# Patient Record
Sex: FEMALE | Race: WHITE | NOT HISPANIC OR LATINO | Employment: OTHER | URBAN - METROPOLITAN AREA
[De-identification: names, ages, dates, MRNs, and addresses within clinical notes are randomized per-mention and may not be internally consistent; named-entity substitution may affect disease eponyms.]

---

## 2023-04-04 ENCOUNTER — OFFICE VISIT (OUTPATIENT)
Dept: PRIMARY CARE | Facility: CLINIC | Age: 78
End: 2023-04-04
Payer: MEDICARE

## 2023-04-04 VITALS
SYSTOLIC BLOOD PRESSURE: 120 MMHG | BODY MASS INDEX: 22.13 KG/M2 | WEIGHT: 132.8 LBS | HEART RATE: 74 BPM | OXYGEN SATURATION: 97 % | DIASTOLIC BLOOD PRESSURE: 50 MMHG | HEIGHT: 65 IN | TEMPERATURE: 97.6 F

## 2023-04-04 DIAGNOSIS — Z00.00 MEDICARE ANNUAL WELLNESS VISIT, SUBSEQUENT: ICD-10-CM

## 2023-04-04 DIAGNOSIS — E78.2 MIXED HYPERLIPIDEMIA: Primary | ICD-10-CM

## 2023-04-04 DIAGNOSIS — I10 HTN (HYPERTENSION), BENIGN: ICD-10-CM

## 2023-04-04 PROCEDURE — 1036F TOBACCO NON-USER: CPT | Performed by: STUDENT IN AN ORGANIZED HEALTH CARE EDUCATION/TRAINING PROGRAM

## 2023-04-04 PROCEDURE — 1157F ADVNC CARE PLAN IN RCRD: CPT | Performed by: STUDENT IN AN ORGANIZED HEALTH CARE EDUCATION/TRAINING PROGRAM

## 2023-04-04 PROCEDURE — 3074F SYST BP LT 130 MM HG: CPT | Performed by: STUDENT IN AN ORGANIZED HEALTH CARE EDUCATION/TRAINING PROGRAM

## 2023-04-04 PROCEDURE — 1159F MED LIST DOCD IN RCRD: CPT | Performed by: STUDENT IN AN ORGANIZED HEALTH CARE EDUCATION/TRAINING PROGRAM

## 2023-04-04 PROCEDURE — 3078F DIAST BP <80 MM HG: CPT | Performed by: STUDENT IN AN ORGANIZED HEALTH CARE EDUCATION/TRAINING PROGRAM

## 2023-04-04 PROCEDURE — 99214 OFFICE O/P EST MOD 30 MIN: CPT | Performed by: STUDENT IN AN ORGANIZED HEALTH CARE EDUCATION/TRAINING PROGRAM

## 2023-04-04 PROCEDURE — 1160F RVW MEDS BY RX/DR IN RCRD: CPT | Performed by: STUDENT IN AN ORGANIZED HEALTH CARE EDUCATION/TRAINING PROGRAM

## 2023-04-04 RX ORDER — METOPROLOL SUCCINATE 25 MG/1
25 TABLET, EXTENDED RELEASE ORAL DAILY
COMMUNITY
End: 2023-04-18 | Stop reason: SDUPTHER

## 2023-04-04 RX ORDER — ATORVASTATIN CALCIUM 40 MG/1
40 TABLET, FILM COATED ORAL DAILY
COMMUNITY
End: 2023-04-18 | Stop reason: SDUPTHER

## 2023-04-04 ASSESSMENT — ENCOUNTER SYMPTOMS
PALPITATIONS: 0
NAUSEA: 0
FEVER: 0
ABDOMINAL PAIN: 0
COUGH: 0
DIARRHEA: 0
NUMBNESS: 0
CONSTIPATION: 0
FREQUENCY: 0
DIZZINESS: 0
HEADACHES: 0
WHEEZING: 0
FATIGUE: 0
HEMATURIA: 0
SHORTNESS OF BREATH: 0
NERVOUS/ANXIOUS: 0
CHILLS: 0
DYSURIA: 0
DYSPHORIC MOOD: 0

## 2023-04-04 ASSESSMENT — PATIENT HEALTH QUESTIONNAIRE - PHQ9: 1. LITTLE INTEREST OR PLEASURE IN DOING THINGS: NOT AT ALL

## 2023-04-04 NOTE — PROGRESS NOTES
"Subjective   Patient ID: Deena Pop is a 77 y.o. female who presents for new PCP.    HPI   Deena Pop  is a new patient here to get established. Doesn't need refills today, will let me know    Previous PCP: Dr Amin  Past medical history: HTN, HLD, Osteoporosis, varicose vv, CVA cerebellar  Family Hx:   Family History   Problem Relation Name Age of Onset   • Hypertension Mother     • Hypertension Father     • Colon cancer Mother's Brother     • Stroke Maternal Grandmother        Specialists include: Vascular surgery: Varicose VV, Dr Myers, injections  -Podiatry: Dr Xaio, toenails  -eye doctor  -dentist  -dermatology    Immunizations: Hasn't had shingrix, declines tdap b/c of SE of pain  Colonoscopy: n/a  Mammogram: n/a  Tobacco: n/a  Alcohol: very occasional  Illicit drugs: no  Caffeine: no  Review of Systems   Constitutional:  Negative for chills, fatigue and fever.   Respiratory:  Negative for cough, shortness of breath and wheezing.    Cardiovascular:  Negative for chest pain, palpitations and leg swelling.   Gastrointestinal:  Negative for abdominal pain, constipation, diarrhea and nausea.   Genitourinary:  Negative for dysuria, frequency, hematuria and urgency.   Neurological:  Negative for dizziness, numbness and headaches.   Psychiatric/Behavioral:  Negative for dysphoric mood. The patient is not nervous/anxious.        Objective   /78 (BP Location: Left arm, Patient Position: Sitting, BP Cuff Size: Adult)   Pulse 74   Temp 36.4 °C (97.6 °F) (Temporal)   Ht 1.651 m (5' 5\")   Wt 60.2 kg (132 lb 12.8 oz)   SpO2 97%   BMI 22.10 kg/m²     Physical Exam  Constitutional:       Appearance: Normal appearance.   HENT:      Head: Normocephalic and atraumatic.   Eyes:      Extraocular Movements: Extraocular movements intact.      Pupils: Pupils are equal, round, and reactive to light.   Cardiovascular:      Rate and Rhythm: Normal rate and regular rhythm.      Heart sounds: Normal heart " sounds. No murmur heard.  Pulmonary:      Effort: Pulmonary effort is normal.      Breath sounds: Normal breath sounds. No wheezing.   Abdominal:      General: Bowel sounds are normal.      Palpations: Abdomen is soft.      Tenderness: There is no abdominal tenderness. There is no guarding.   Musculoskeletal:         General: Normal range of motion.   Skin:     General: Skin is warm and dry.   Neurological:      General: No focal deficit present.      Mental Status: She is alert and oriented to person, place, and time.   Psychiatric:         Mood and Affect: Mood normal.         Behavior: Behavior normal.       Assessment/Plan   Problem List Items Addressed This Visit          Circulatory    HTN (hypertension), benign     stable            Other    Hyperlipidemia - Primary     Stable, will recheck with physical later this year          Other Visit Diagnoses       Medicare annual wellness visit, subsequent        Relevant Orders    Follow Up In Primary Care                 Patient understands and agrees with treatment plan    Radha Armstrong DO   04/04/23

## 2023-04-07 ENCOUNTER — TELEPHONE (OUTPATIENT)
Dept: PRIMARY CARE | Facility: CLINIC | Age: 78
End: 2023-04-07
Payer: MEDICARE

## 2023-04-18 DIAGNOSIS — E78.5 HYPERLIPIDEMIA, UNSPECIFIED HYPERLIPIDEMIA TYPE: ICD-10-CM

## 2023-04-18 DIAGNOSIS — I10 HTN (HYPERTENSION), BENIGN: ICD-10-CM

## 2023-04-20 RX ORDER — METOPROLOL SUCCINATE 25 MG/1
25 TABLET, EXTENDED RELEASE ORAL DAILY
Qty: 90 TABLET | Refills: 1 | Status: SHIPPED | OUTPATIENT
Start: 2023-04-20 | End: 2023-10-04

## 2023-04-20 RX ORDER — ATORVASTATIN CALCIUM 40 MG/1
40 TABLET, FILM COATED ORAL DAILY
Qty: 90 TABLET | Refills: 1 | Status: SHIPPED | OUTPATIENT
Start: 2023-04-20 | End: 2023-10-19 | Stop reason: SDUPTHER

## 2023-06-12 PROBLEM — B02.9 SHINGLES: Status: ACTIVE | Noted: 2023-06-12

## 2023-06-12 RX ORDER — VALACYCLOVIR HYDROCHLORIDE 1 G/1
1 TABLET, FILM COATED ORAL 3 TIMES DAILY
COMMUNITY
Start: 2020-11-21 | End: 2023-11-09 | Stop reason: ALTCHOICE

## 2023-06-13 ENCOUNTER — OFFICE VISIT (OUTPATIENT)
Dept: PRIMARY CARE | Facility: CLINIC | Age: 78
End: 2023-06-13
Payer: MEDICARE

## 2023-06-13 VITALS
BODY MASS INDEX: 22.3 KG/M2 | SYSTOLIC BLOOD PRESSURE: 134 MMHG | TEMPERATURE: 97.6 F | WEIGHT: 134 LBS | DIASTOLIC BLOOD PRESSURE: 82 MMHG

## 2023-06-13 DIAGNOSIS — L73.9 FOLLICULITIS: Primary | ICD-10-CM

## 2023-06-13 PROCEDURE — 1160F RVW MEDS BY RX/DR IN RCRD: CPT | Performed by: NURSE PRACTITIONER

## 2023-06-13 PROCEDURE — 1036F TOBACCO NON-USER: CPT | Performed by: NURSE PRACTITIONER

## 2023-06-13 PROCEDURE — 3075F SYST BP GE 130 - 139MM HG: CPT | Performed by: NURSE PRACTITIONER

## 2023-06-13 PROCEDURE — 1159F MED LIST DOCD IN RCRD: CPT | Performed by: NURSE PRACTITIONER

## 2023-06-13 PROCEDURE — 3079F DIAST BP 80-89 MM HG: CPT | Performed by: NURSE PRACTITIONER

## 2023-06-13 PROCEDURE — 99213 OFFICE O/P EST LOW 20 MIN: CPT | Performed by: NURSE PRACTITIONER

## 2023-06-13 PROCEDURE — 1157F ADVNC CARE PLAN IN RCRD: CPT | Performed by: NURSE PRACTITIONER

## 2023-06-13 RX ORDER — VITAMIN B COMPLEX
1 CAPSULE ORAL DAILY
COMMUNITY

## 2023-06-13 RX ORDER — FAMOTIDINE 20 MG/1
20 TABLET, FILM COATED ORAL NIGHTLY PRN
COMMUNITY

## 2023-06-13 RX ORDER — ASPIRIN 81 MG/1
81 TABLET ORAL DAILY
COMMUNITY

## 2023-06-13 RX ORDER — CHOLECALCIFEROL (VITAMIN D3) 125 MCG
5000 CAPSULE ORAL DAILY
COMMUNITY

## 2023-06-13 RX ORDER — CALCIUM CARBONATE 600 MG
1 TABLET ORAL DAILY
COMMUNITY

## 2023-06-13 ASSESSMENT — ENCOUNTER SYMPTOMS
CARDIOVASCULAR NEGATIVE: 1
RESPIRATORY NEGATIVE: 1
CONSTITUTIONAL NEGATIVE: 1
CONSTIPATION: 1

## 2023-06-13 NOTE — PROGRESS NOTES
Subjective   Patient ID: Deena Pop is a 77 y.o. female who presents for skin complaint (Lump inside right side of vagina x 4 days).    HPI Presents today with 4 days history of lump on the right lower labial fold  She has been staining to have BM because of constipation.  Is not sure if that had anything to do with why this happened.   Noticed the 4 days ago was wiping and noticed a sore spot  Yesterday was bleeding  Not quite as sore today. Today still  has   She does wear urine pad for urinary incontinence  Review of Systems   Constitutional: Negative.    Respiratory: Negative.     Cardiovascular: Negative.    Gastrointestinal:  Positive for constipation.   Genitourinary:         As noted in HPI         Objective   /82 (BP Location: Right arm, Patient Position: Sitting)   Temp 36.4 °C (97.6 °F) (Temporal)   Wt 60.8 kg (134 lb)   BMI 22.30 kg/m²     Physical Exam  Constitutional:       Appearance: Normal appearance.   Cardiovascular:      Rate and Rhythm: Normal rate and regular rhythm.   Pulmonary:      Effort: Pulmonary effort is normal.      Breath sounds: Normal breath sounds.   Genitourinary:     Pubic Area: No rash.       Labia:         Right: Lesion present.         Left: No rash, tenderness, lesion or injury.       Comments: Scabbed tender to touch pustule tight lower labia  Musculoskeletal:         General: Normal range of motion.   Neurological:      General: No focal deficit present.      Mental Status: She is alert.   Psychiatric:         Mood and Affect: Mood normal.         Behavior: Behavior normal.         Assessment/Plan   Problem List Items Addressed This Visit    None  Visit Diagnoses       Folliculitis    -  Primary

## 2023-09-29 ENCOUNTER — TELEPHONE (OUTPATIENT)
Dept: PHARMACY | Facility: HOSPITAL | Age: 78
End: 2023-09-29
Payer: MEDICARE

## 2023-09-29 NOTE — TELEPHONE ENCOUNTER
Population Health: Outreach by Ambulatory Pharmacy Team    Payor: DEVAUGHN BAKER  Reason: Adherence  Medication: Atorvastatin  Outcome: Patient Reached: Will Refill, [facility] is sending her a refill in the mail!     Taylor Singh   PharmD Candidate 2024

## 2023-10-02 DIAGNOSIS — I10 HTN (HYPERTENSION), BENIGN: ICD-10-CM

## 2023-10-04 RX ORDER — METOPROLOL SUCCINATE 25 MG/1
TABLET, EXTENDED RELEASE ORAL
Qty: 90 TABLET | Refills: 0 | Status: SHIPPED | OUTPATIENT
Start: 2023-10-04 | End: 2024-01-02

## 2023-10-13 ENCOUNTER — APPOINTMENT (OUTPATIENT)
Dept: RADIOLOGY | Facility: HOSPITAL | Age: 78
End: 2023-10-13
Payer: MEDICARE

## 2023-10-13 ENCOUNTER — TELEPHONE (OUTPATIENT)
Dept: PRIMARY CARE | Facility: CLINIC | Age: 78
End: 2023-10-13
Payer: MEDICARE

## 2023-10-13 ENCOUNTER — HOSPITAL ENCOUNTER (EMERGENCY)
Facility: HOSPITAL | Age: 78
Discharge: HOME | End: 2023-10-13
Attending: EMERGENCY MEDICINE
Payer: MEDICARE

## 2023-10-13 VITALS
TEMPERATURE: 98.6 F | HEIGHT: 64 IN | BODY MASS INDEX: 22.21 KG/M2 | HEART RATE: 72 BPM | WEIGHT: 130.07 LBS | OXYGEN SATURATION: 98 % | RESPIRATION RATE: 20 BRPM | SYSTOLIC BLOOD PRESSURE: 120 MMHG | DIASTOLIC BLOOD PRESSURE: 87 MMHG

## 2023-10-13 DIAGNOSIS — R42 DIZZINESS: Primary | ICD-10-CM

## 2023-10-13 LAB
ANION GAP BLDV CALCULATED.4IONS-SCNC: 9 MMOL/L (ref 10–25)
ANION GAP SERPL CALC-SCNC: 14 MMOL/L (ref 10–20)
BASE EXCESS BLDV CALC-SCNC: 3.9 MMOL/L (ref -2–3)
BASOPHILS # BLD AUTO: 0.03 X10*3/UL (ref 0–0.1)
BASOPHILS NFR BLD AUTO: 0.5 %
BNP SERPL-MCNC: 224 PG/ML (ref 0–99)
BODY TEMPERATURE: 37 DEGREES CELSIUS
BUN SERPL-MCNC: 19 MG/DL (ref 6–23)
CA-I BLDV-SCNC: 1.22 MMOL/L (ref 1.1–1.33)
CALCIUM SERPL-MCNC: 10.3 MG/DL (ref 8.6–10.3)
CARDIAC TROPONIN I PNL SERPL HS: 10 NG/L (ref 0–13)
CHLORIDE BLDV-SCNC: 101 MMOL/L (ref 98–107)
CHLORIDE SERPL-SCNC: 104 MMOL/L (ref 98–107)
CO2 SERPL-SCNC: 28 MMOL/L (ref 21–32)
CREAT SERPL-MCNC: 0.87 MG/DL (ref 0.5–1.05)
EOSINOPHIL # BLD AUTO: 0.04 X10*3/UL (ref 0–0.4)
EOSINOPHIL NFR BLD AUTO: 0.7 %
ERYTHROCYTE [DISTWIDTH] IN BLOOD BY AUTOMATED COUNT: 12.2 % (ref 11.5–14.5)
GFR SERPL CREATININE-BSD FRML MDRD: 68 ML/MIN/1.73M*2
GLUCOSE BLDV-MCNC: 107 MG/DL (ref 74–99)
GLUCOSE SERPL-MCNC: 97 MG/DL (ref 74–99)
HCO3 BLDV-SCNC: 30.6 MMOL/L (ref 22–26)
HCT VFR BLD AUTO: 47.2 % (ref 36–46)
HCT VFR BLD EST: 46 % (ref 36–46)
HGB BLD-MCNC: 15.9 G/DL (ref 12–16)
HGB BLDV-MCNC: 15.3 G/DL (ref 12–16)
IMM GRANULOCYTES # BLD AUTO: 0.01 X10*3/UL (ref 0–0.5)
IMM GRANULOCYTES NFR BLD AUTO: 0.2 % (ref 0–0.9)
INHALED O2 CONCENTRATION: 100 %
LACTATE BLDV-SCNC: 0.9 MMOL/L (ref 0.4–2)
LYMPHOCYTES # BLD AUTO: 1.83 X10*3/UL (ref 0.8–3)
LYMPHOCYTES NFR BLD AUTO: 31.5 %
MAGNESIUM SERPL-MCNC: 2.14 MG/DL (ref 1.6–2.4)
MCH RBC QN AUTO: 32.7 PG (ref 26–34)
MCHC RBC AUTO-ENTMCNC: 33.7 G/DL (ref 32–36)
MCV RBC AUTO: 97 FL (ref 80–100)
MONOCYTES # BLD AUTO: 0.45 X10*3/UL (ref 0.05–0.8)
MONOCYTES NFR BLD AUTO: 7.7 %
NEUTROPHILS # BLD AUTO: 3.45 X10*3/UL (ref 1.6–5.5)
NEUTROPHILS NFR BLD AUTO: 59.4 %
NRBC BLD-RTO: 0 /100 WBCS (ref 0–0)
OXYHGB MFR BLDV: 22.4 % (ref 45–75)
PCO2 BLDV: 53 MM HG (ref 41–51)
PH BLDV: 7.37 PH (ref 7.33–7.43)
PLATELET # BLD AUTO: 248 X10*3/UL (ref 150–450)
PMV BLD AUTO: 11 FL (ref 7.5–11.5)
PO2 BLDV: 20 MM HG (ref 35–45)
POTASSIUM BLDV-SCNC: 4.1 MMOL/L (ref 3.5–5.3)
POTASSIUM SERPL-SCNC: 4.2 MMOL/L (ref 3.5–5.3)
RBC # BLD AUTO: 4.86 X10*6/UL (ref 4–5.2)
SAO2 % BLDV: 23 % (ref 45–75)
SODIUM BLDV-SCNC: 136 MMOL/L (ref 136–145)
SODIUM SERPL-SCNC: 142 MMOL/L (ref 136–145)
WBC # BLD AUTO: 5.8 X10*3/UL (ref 4.4–11.3)

## 2023-10-13 PROCEDURE — 82330 ASSAY OF CALCIUM: CPT | Performed by: EMERGENCY MEDICINE

## 2023-10-13 PROCEDURE — 84484 ASSAY OF TROPONIN QUANT: CPT | Performed by: EMERGENCY MEDICINE

## 2023-10-13 PROCEDURE — 70450 CT HEAD/BRAIN W/O DYE: CPT | Performed by: RADIOLOGY

## 2023-10-13 PROCEDURE — 36415 COLL VENOUS BLD VENIPUNCTURE: CPT | Performed by: EMERGENCY MEDICINE

## 2023-10-13 PROCEDURE — 83880 ASSAY OF NATRIURETIC PEPTIDE: CPT | Performed by: EMERGENCY MEDICINE

## 2023-10-13 PROCEDURE — 71045 X-RAY EXAM CHEST 1 VIEW: CPT | Mod: FY

## 2023-10-13 PROCEDURE — 70450 CT HEAD/BRAIN W/O DYE: CPT | Mod: MG

## 2023-10-13 PROCEDURE — 99284 EMERGENCY DEPT VISIT MOD MDM: CPT | Mod: 25

## 2023-10-13 PROCEDURE — 85018 HEMOGLOBIN: CPT | Mod: 59 | Performed by: EMERGENCY MEDICINE

## 2023-10-13 PROCEDURE — 71045 X-RAY EXAM CHEST 1 VIEW: CPT | Performed by: RADIOLOGY

## 2023-10-13 PROCEDURE — 84132 ASSAY OF SERUM POTASSIUM: CPT | Mod: 59 | Performed by: EMERGENCY MEDICINE

## 2023-10-13 PROCEDURE — 83735 ASSAY OF MAGNESIUM: CPT | Performed by: EMERGENCY MEDICINE

## 2023-10-13 PROCEDURE — 85025 COMPLETE CBC W/AUTO DIFF WBC: CPT | Performed by: EMERGENCY MEDICINE

## 2023-10-13 ASSESSMENT — COLUMBIA-SUICIDE SEVERITY RATING SCALE - C-SSRS
1. IN THE PAST MONTH, HAVE YOU WISHED YOU WERE DEAD OR WISHED YOU COULD GO TO SLEEP AND NOT WAKE UP?: NO
6. HAVE YOU EVER DONE ANYTHING, STARTED TO DO ANYTHING, OR PREPARED TO DO ANYTHING TO END YOUR LIFE?: NO
2. HAVE YOU ACTUALLY HAD ANY THOUGHTS OF KILLING YOURSELF?: NO

## 2023-10-13 ASSESSMENT — LIFESTYLE VARIABLES
HAVE PEOPLE ANNOYED YOU BY CRITICIZING YOUR DRINKING: NO
EVER HAD A DRINK FIRST THING IN THE MORNING TO STEADY YOUR NERVES TO GET RID OF A HANGOVER: NO
REASON UNABLE TO ASSESS: NO
HAVE YOU EVER FELT YOU SHOULD CUT DOWN ON YOUR DRINKING: NO
EVER FELT BAD OR GUILTY ABOUT YOUR DRINKING: NO

## 2023-10-13 ASSESSMENT — PAIN SCALES - GENERAL
PAINLEVEL_OUTOF10: 3
PAINLEVEL_OUTOF10: 0 - NO PAIN

## 2023-10-13 ASSESSMENT — PAIN - FUNCTIONAL ASSESSMENT: PAIN_FUNCTIONAL_ASSESSMENT: 0-10

## 2023-10-13 NOTE — ED PROVIDER NOTES
HPI   Chief Complaint   Patient presents with    Dizziness     Onset midnight last night.  States had balance problems shortly.  No focal weakness.  Symptoms resolved completely now.         HPI:  Patient is a 77-year-old female with history of SVT and remote stroke who presents emergency department after having dizziness with symptoms that feel similar to her old stroke that was 6 years ago.  Patient reports she was in her usual state of health until around midnight when she got very dizzy she was unsteady and had to hold onto things to move around in her house she went to sleep thinking she would be better the next day she says at 7 AM when she awoke she still had some very mild dizziness but it was much improved from when it was at midnight and then about 8:00 her symptoms seem to have completely resolved she states that she now feels completely back to her normal self and the only reason why she came into the ED was because people kept telling her that she should get checked out and she called her primary care provider who all and will also advised her to go to the emergency department.  Currently she is asymptomatic denies any headache no dizziness no lightheadedness no chest discomfort no shortness of breath no abdominal complaints    Limitations to history: None  Independent Historians: Family at bedside  External Records Reviewed: EMR records and paper medication list  ------------------------------------------------------------------------------------------------------------------------------------------  ROS: a ten point review of systems was performed and negative except as per HPI.  ------------------------------------------------------------------------------------------------------------------------------------------  PMH / PSH: as per HPI, reviewed in EMR and discussed with the patient []  MEDS:  reviewed in EMR and discussed with the patient []  ALLERGIES: reviewed in EMR[]  SocH:  as per HPI,  otherwise reviewed in EMR []  FH:  as per HPI, otherwise reviewed in EMR []  ------------------------------------------------------------------------------------------------------------------------------------------  Physical Exam:  VS: As documented in the triage note and EMR flowsheet from this visit was reviewed  General: Well appearing. No acute distress.   Eyes:  Extraocular movements grossly intact. No scleral icterus.   HEENT: Atraumatic. Normocephalic.    Neck: Supple. No gross masses  CV: RRR, audible S1/S2, 2+ symmetric peripheral pulses  Resp: Clear to auscultation bilaterally. No respiratory distress.  Non-labored respirations  GI: Soft, non-tender, non-distended, no rebound or gaurding  MSK: Symmetric muscle bulk. No gross step offs or deformities.  Skin: Warm, dry, no obvious rash.  Neuro: Speech fluent. Awake. Alert. Appropriate conversation.  Psych: Appropriate mood and affect for situation      ------------------------------------------------------------------------------------------------------------------------------------------  Hospital Course / Medical Decision Making:  Independent Interpretations:  EKG -   Twelve-lead EKG performed and independently interpreted by me as showing normal sinus rhythm at a ventricular rate of 66 with signs of LVH QRS interval of 134 QTc of 464 with T wave depression in the lateral leads no ST segment elevation    77-year-old female presenting with chief complaint of dizziness she is currently asymptomatic and has been feeling back at her normal baseline self since about 8:00 this morning but came into the ED at the encouragement of her primary physician who recommended she get evaluated based on her history of stroke as well as the symptoms that she experienced last night around midnight.  Her physical exam is reassuring her NIH stroke scale is 0 she has no focal neurologic deficits her gait is steady and normal and no signs of ataxia.    The cardiac monitor  peripheral IV access was obtained labs are drawn.  Blood work was fairly unremarkable electrolytes were all within normal range Noncon CT of the head was negative for any acute findings.  She remained asymptomatic while in the emergency department she was requesting discharge since she was feeling well I did  her regarding her concerning symptoms and discussed her case with the neurologist on-call who agreed that she could have close follow-up in the outpatient setting.  Patient was counseled regarding her diagnosis of dizziness and possible TIA and was encouraged to return immediately to the emergency department or call 911 for any recurrence of significant symptoms.  She was discharged home the care of her family in stable condition and will follow-up with her primary care physician as well as neurology    Patient care discussed with: Neurology consultant  Social Determinants affecting care:     Final diagnosis and disposition: Dizziness            Stephany Suarez MD                                      No data recorded                Patient History   No past medical history on file.  No past surgical history on file.  Family History   Problem Relation Name Age of Onset    Hypertension Mother      Hypertension Father      Colon cancer Mother's Brother      Stroke Maternal Grandmother       Social History     Tobacco Use    Smoking status: Never     Passive exposure: Past    Smokeless tobacco: Never   Substance Use Topics    Alcohol use: Yes     Alcohol/week: 2.0 standard drinks of alcohol     Types: 1 Cans of beer, 1 Standard drinks or equivalent per week    Drug use: Never       Physical Exam   ED Triage Vitals   Temp Heart Rate Resp BP   10/13/23 1624 10/13/23 1624 10/13/23 1624 10/13/23 1624   36.7 °C (98.1 °F) 71 16 147/55      SpO2 Temp Source Heart Rate Source Patient Position   10/13/23 1624 10/13/23 1624 10/13/23 1758 10/13/23 1755   97 % Temporal Monitor Lying      BP Location FiO2 (%)      10/13/23 1759 --     Left arm        Physical Exam  Neurological:      Mental Status: She is alert and oriented to person, place, and time.      GCS: GCS eye subscore is 4. GCS verbal subscore is 5. GCS motor subscore is 6.      Cranial Nerves: Cranial nerves 2-12 are intact.      Sensory: Sensation is intact.      Motor: Motor function is intact.      Coordination: Coordination is intact.      Gait: Gait is intact.         ED Course & MDM        Medical Decision Making      Procedure  Procedures     Stephany Suarez MD  10/13/23 [id number]

## 2023-10-13 NOTE — TELEPHONE ENCOUNTER
PT called and said she thinks she may have had a small stroke last evening, wanted to know what she should do?  I spoke with Dr. Armstrong and she said patient needed to go to the ER.  This was relayed to the patient who expressed understanding.

## 2023-10-18 ENCOUNTER — APPOINTMENT (OUTPATIENT)
Dept: NEUROLOGY | Facility: CLINIC | Age: 78
End: 2023-10-18
Payer: MEDICARE

## 2023-10-19 ENCOUNTER — OFFICE VISIT (OUTPATIENT)
Dept: PRIMARY CARE | Facility: CLINIC | Age: 78
End: 2023-10-19
Payer: MEDICARE

## 2023-10-19 VITALS
BODY MASS INDEX: 22.74 KG/M2 | HEART RATE: 73 BPM | TEMPERATURE: 97.4 F | SYSTOLIC BLOOD PRESSURE: 158 MMHG | DIASTOLIC BLOOD PRESSURE: 88 MMHG | WEIGHT: 132.4 LBS | OXYGEN SATURATION: 97 %

## 2023-10-19 DIAGNOSIS — E78.5 HYPERLIPIDEMIA, UNSPECIFIED HYPERLIPIDEMIA TYPE: ICD-10-CM

## 2023-10-19 DIAGNOSIS — Z86.73 PERSONAL HISTORY OF TRANSIENT ISCHEMIC ATTACK: ICD-10-CM

## 2023-10-19 DIAGNOSIS — G45.9 TIA (TRANSIENT ISCHEMIC ATTACK): Primary | ICD-10-CM

## 2023-10-19 PROCEDURE — 99214 OFFICE O/P EST MOD 30 MIN: CPT | Performed by: STUDENT IN AN ORGANIZED HEALTH CARE EDUCATION/TRAINING PROGRAM

## 2023-10-19 PROCEDURE — 1159F MED LIST DOCD IN RCRD: CPT | Performed by: STUDENT IN AN ORGANIZED HEALTH CARE EDUCATION/TRAINING PROGRAM

## 2023-10-19 PROCEDURE — 1126F AMNT PAIN NOTED NONE PRSNT: CPT | Performed by: STUDENT IN AN ORGANIZED HEALTH CARE EDUCATION/TRAINING PROGRAM

## 2023-10-19 PROCEDURE — 3079F DIAST BP 80-89 MM HG: CPT | Performed by: STUDENT IN AN ORGANIZED HEALTH CARE EDUCATION/TRAINING PROGRAM

## 2023-10-19 PROCEDURE — 3077F SYST BP >= 140 MM HG: CPT | Performed by: STUDENT IN AN ORGANIZED HEALTH CARE EDUCATION/TRAINING PROGRAM

## 2023-10-19 PROCEDURE — 1160F RVW MEDS BY RX/DR IN RCRD: CPT | Performed by: STUDENT IN AN ORGANIZED HEALTH CARE EDUCATION/TRAINING PROGRAM

## 2023-10-19 PROCEDURE — 1036F TOBACCO NON-USER: CPT | Performed by: STUDENT IN AN ORGANIZED HEALTH CARE EDUCATION/TRAINING PROGRAM

## 2023-10-19 RX ORDER — ATORVASTATIN CALCIUM 40 MG/1
40 TABLET, FILM COATED ORAL DAILY
Qty: 90 TABLET | Refills: 1 | Status: SHIPPED | OUTPATIENT
Start: 2023-10-19 | End: 2024-05-14 | Stop reason: SDUPTHER

## 2023-10-19 ASSESSMENT — PATIENT HEALTH QUESTIONNAIRE - PHQ9
SUM OF ALL RESPONSES TO PHQ9 QUESTIONS 1 AND 2: 0
1. LITTLE INTEREST OR PLEASURE IN DOING THINGS: NOT AT ALL
2. FEELING DOWN, DEPRESSED OR HOPELESS: NOT AT ALL

## 2023-10-19 NOTE — PATIENT INSTRUCTIONS
We will check a Holter monitor, echocardiogram, and carotid artery ultrasound  Follow-up with the neurologist as scheduled  If you develop any of the symptoms again please call [phone]

## 2023-10-19 NOTE — PROGRESS NOTES
Subjective   Patient ID: Deena Pop is a 77 y.o. female who presents for Vertigo ( [place] follow up 10/13/23).    HPI   The patient is here for a hospital follow up.  Hospital records were reviewed prior to visit, including relevant labs, imaging findings, consultant notes, and discharge summary.  Medications were reconciled and are current, reviewed today.     Patient here for hospital follow-up.  She called our office and stated that she became dizzy and was having symptoms that felt like her previous stroke.  She was feeling fine up until midnight and went to sleep thinking it would be better by the time she woke up.  At 7 AM she still had some very mild dizziness but it was improved from the prior symptoms.  At around 8 AM symptoms completely resolved.  She felt back to normal.  We advised her to go to the emergency department as did others around her.    Blood work was relatively normal.  Noncontrast head CT was negative for any acute findings.  She was asymptomatic her entire time in the emergency department and was requesting to discharge since she felt well.  She was counseled regarding her symptoms and the emergency room doctor discussed the case with the on-call neurologist. He said that she could have close follow-up in the outpatient setting and could be discharged with return precautions.    Isn't seeing neuro until December.    Review of Systems   Constitutional:  Negative for chills, fatigue and fever.   Respiratory:  Negative for cough, shortness of breath and wheezing.    Cardiovascular:  Negative for chest pain, palpitations and leg swelling.   Gastrointestinal:  Negative for abdominal pain, constipation, diarrhea and nausea.   Genitourinary:  Negative for dysuria, frequency, hematuria and urgency.   Neurological:  Negative for dizziness, numbness and headaches.   Psychiatric/Behavioral:  Negative for dysphoric mood. The patient is not nervous/anxious.        Objective   /88 (BP  Location: Left arm, Patient Position: Sitting, BP Cuff Size: Adult)   Pulse 73   Temp 36.3 °C (97.4 °F) (Temporal)   Wt 60.1 kg (132 lb 6.4 oz)   SpO2 97%   BMI 22.74 kg/m²     Physical Exam  Constitutional:       Appearance: Normal appearance.   HENT:      Head: Normocephalic and atraumatic.   Eyes:      Extraocular Movements: Extraocular movements intact.      Pupils: Pupils are equal, round, and reactive to light.   Cardiovascular:      Rate and Rhythm: Normal rate and regular rhythm.      Heart sounds: Normal heart sounds. No murmur heard.  Pulmonary:      Effort: Pulmonary effort is normal.      Breath sounds: Normal breath sounds. No wheezing.   Abdominal:      General: Bowel sounds are normal.      Palpations: Abdomen is soft.      Tenderness: There is no abdominal tenderness. There is no guarding.   Musculoskeletal:         General: Normal range of motion.   Skin:     General: Skin is warm and dry.   Neurological:      General: No focal deficit present.      Mental Status: She is alert and oriented to person, place, and time.      Sensory: Sensation is intact.      Motor: No weakness, tremor, abnormal muscle tone or pronator drift.      Coordination: Romberg sign negative. Coordination normal. Finger-Nose-Finger Test and Heel to Shin Test normal. Rapid alternating movements normal.      Gait: Gait normal.   Psychiatric:         Mood and Affect: Mood normal.         Behavior: Behavior normal.         Assessment/Plan   Problem List Items Addressed This Visit       Hyperlipidemia    Relevant Medications    atorvastatin (Lipitor) 40 mg tablet     Other Visit Diagnoses       TIA (transient ischemic attack)    -  Primary    Relevant Orders    Transthoracic Echo (TTE) Complete    Vascular US carotid artery duplex bilateral    Holter or Event Cardiac Monitor    Personal history of transient ischemic attack        Relevant Orders    Vascular US carotid artery duplex bilateral          Patient has what sounds  like a TIA.  She does have a history of stroke.  She was not admitted and therefore did not have a stroke work-up.  Therefore, I am checking a TTE, carotid ultrasound, and a Holter monitor as well.  She is getting in with neurology and I recommended keeping that appointment but we will do this work-up in the meantime.  We will touch base following results       Patient understands and agrees with treatment plan    Radha Armstrong, DO   10/24/23

## 2023-10-24 ASSESSMENT — ENCOUNTER SYMPTOMS
NERVOUS/ANXIOUS: 0
CONSTIPATION: 0
DYSURIA: 0
FREQUENCY: 0
DIARRHEA: 0
PALPITATIONS: 0
FEVER: 0
HEMATURIA: 0
COUGH: 0
DIZZINESS: 0
CHILLS: 0
NAUSEA: 0
NUMBNESS: 0
ABDOMINAL PAIN: 0
WHEEZING: 0
FATIGUE: 0
SHORTNESS OF BREATH: 0
HEADACHES: 0
DYSPHORIC MOOD: 0

## 2023-10-26 ENCOUNTER — HOSPITAL ENCOUNTER (OUTPATIENT)
Dept: CARDIOLOGY | Facility: HOSPITAL | Age: 78
Discharge: HOME | End: 2023-10-26
Payer: MEDICARE

## 2023-10-26 DIAGNOSIS — G45.9 TIA (TRANSIENT ISCHEMIC ATTACK): ICD-10-CM

## 2023-10-26 LAB — EJECTION FRACTION APICAL 4 CHAMBER: 68.9

## 2023-10-26 PROCEDURE — 93306 TTE W/DOPPLER COMPLETE: CPT

## 2023-10-26 PROCEDURE — 2500000004 HC RX 250 GENERAL PHARMACY W/ HCPCS (ALT 636 FOR OP/ED): Performed by: INTERNAL MEDICINE

## 2023-10-26 PROCEDURE — 93306 TTE W/DOPPLER COMPLETE: CPT | Performed by: INTERNAL MEDICINE

## 2023-10-26 RX ADMIN — HUMAN ALBUMIN MICROSPHERES AND PERFLUTREN 0.5 ML: 10; .22 INJECTION, SOLUTION INTRAVENOUS at 09:09

## 2023-11-03 ENCOUNTER — OFFICE VISIT (OUTPATIENT)
Dept: PODIATRY | Facility: CLINIC | Age: 78
End: 2023-11-03
Payer: MEDICARE

## 2023-11-03 DIAGNOSIS — B35.1 TINEA UNGUIUM: Primary | ICD-10-CM

## 2023-11-03 DIAGNOSIS — M79.675 TOE PAIN, LEFT: ICD-10-CM

## 2023-11-03 DIAGNOSIS — M79.674 TOE PAIN, RIGHT: ICD-10-CM

## 2023-11-03 PROCEDURE — 3079F DIAST BP 80-89 MM HG: CPT | Performed by: PODIATRIST

## 2023-11-03 PROCEDURE — 3077F SYST BP >= 140 MM HG: CPT | Performed by: PODIATRIST

## 2023-11-03 PROCEDURE — 1159F MED LIST DOCD IN RCRD: CPT | Performed by: PODIATRIST

## 2023-11-03 PROCEDURE — 1160F RVW MEDS BY RX/DR IN RCRD: CPT | Performed by: PODIATRIST

## 2023-11-03 PROCEDURE — 1126F AMNT PAIN NOTED NONE PRSNT: CPT | Performed by: PODIATRIST

## 2023-11-03 PROCEDURE — 11721 DEBRIDE NAIL 6 OR MORE: CPT | Performed by: PODIATRIST

## 2023-11-03 PROCEDURE — 1036F TOBACCO NON-USER: CPT | Performed by: PODIATRIST

## 2023-11-05 NOTE — PROGRESS NOTES
CC:  painful thickened and elongated toenails    HPI:  Pt presents complaining painful thickened and elongated toenails that are difficult to manage.  Onset was gradual with worsening course until recently.  Aggravated by shoe gear and ambulation.       PCP: Dr. Armstrong  Last visit: 10-19-23     PMH  No past medical history on file.  MEDS    Current Outpatient Medications:     aspirin 81 mg EC tablet, Take 1 tablet (81 mg) by mouth once daily., Disp: , Rfl:     atorvastatin (Lipitor) 40 mg tablet, Take 1 tablet (40 mg) by mouth once daily. At bedtime, Disp: 90 tablet, Rfl: 1    b complex vitamins (Vitamins B Complex) capsule, Take 1 capsule by mouth once daily., Disp: , Rfl:     calcium carbonate 600 mg calcium (1,500 mg) tablet, Take 1 tablet (600 mg) by mouth once daily., Disp: , Rfl:     cholecalciferol (Vitamin D-3) 125 MCG (5000 UT) capsule, Take 1 capsule (5,000 Units) by mouth once daily., Disp: , Rfl:     famotidine (Pepcid) 20 mg tablet, Take 1 tablet (20 mg) by mouth as needed at bedtime for indigestion., Disp: , Rfl:     fluticasone (Veramyst) 27.5 mcg/actuation nasal spray, Administer 2 sprays into affected nostril(s) once daily., Disp: , Rfl:     metoprolol succinate XL (Toprol-XL) 25 mg 24 hr tablet, TAKE 1 TABLET DAILY (DO NOT CRUSH OR CHEW), Disp: 90 tablet, Rfl: 0    valACYclovir (Valtrex) 1 gram tablet, Take 1 tablet (1,000 mg) by mouth 3 times a day., Disp: , Rfl:   Allergies  Allergies   Allergen Reactions    Sulfa (Sulfonamide Antibiotics) Unknown     Social History     Socioeconomic History    Marital status:      Spouse name: Not on file    Number of children: Not on file    Years of education: Not on file    Highest education level: Not on file   Occupational History    Not on file   Tobacco Use    Smoking status: Never     Passive exposure: Past    Smokeless tobacco: Never   Substance and Sexual Activity    Alcohol use: Yes     Alcohol/week: 2.0 standard drinks of alcohol     Types:  1 Cans of beer, 1 Standard drinks or equivalent per week    Drug use: Never    Sexual activity: Not on file   Other Topics Concern    Not on file   Social History Narrative    Not on file     Social Determinants of Health     Financial Resource Strain: Not on file   Food Insecurity: Not on file   Transportation Needs: Not on file   Physical Activity: Not on file   Stress: Not on file   Social Connections: Not on file   Intimate Partner Violence: Not on file   Housing Stability: Not on file     Family History   Problem Relation Name Age of Onset    Hypertension Mother      Hypertension Father      Colon cancer Mother's Brother      Stroke Maternal Grandmother       No past surgical history on file.    REVIEW OF SYSTEMS  2/4 b/l.  mild edema noted. mild varicosities b/l.  CFT  5 seconds to all digits bilateral.  Skin temperature is warm to warm from proximal to distal bilateral.      Muscular: Strength is 5/5 for all instrinsic and extrinsic muscle groups.     Neuro:  Proprioception present.   Sensation to vibration is  present. Protective sensation present  at all pedal sites via [place] 5.07 monofilament bilateral.  Light touch intact bilateral.     Derm:    Decreased hair growth b/l le  Left toenails: 1-5 Brittleness, crumbling upon debridement, subungual debris, elongation, mycotic appearance, tenderness, and thickness.   Right toenails: 1-5 Brittleness, crumbling upon debridement, subungual debris, elongation, mycotic appearance, tenderness, and thickness.       ASSESSMENT:    Tinea Unguium [B35.1]   Pain in right toe(s) [M79.674]   Pain in left toe(s) [M79.675]       PLAN:   A comprehensive history and physical examination were preformed. The patient was educated on clinical findings, diagnosis and treatment plans. Patient understands all that has been explained and all questions were answered to apparent satisfaction.     - Debrided toenails 1-10 in length and height.   - Follow up in 9-12 weeks.        Piter Xiao DPM

## 2023-11-06 ENCOUNTER — APPOINTMENT (OUTPATIENT)
Dept: PRIMARY CARE | Facility: CLINIC | Age: 78
End: 2023-11-06
Payer: MEDICARE

## 2023-11-08 ENCOUNTER — HOSPITAL ENCOUNTER (OUTPATIENT)
Dept: VASCULAR MEDICINE | Facility: HOSPITAL | Age: 78
Discharge: HOME | End: 2023-11-08
Payer: MEDICARE

## 2023-11-08 ENCOUNTER — HOSPITAL ENCOUNTER (OUTPATIENT)
Dept: CARDIOLOGY | Facility: HOSPITAL | Age: 78
Discharge: HOME | End: 2023-11-08
Payer: MEDICARE

## 2023-11-08 DIAGNOSIS — Z86.73 PERSONAL HISTORY OF TRANSIENT ISCHEMIC ATTACK: ICD-10-CM

## 2023-11-08 DIAGNOSIS — R09.89 OTHER SPECIFIED SYMPTOMS AND SIGNS INVOLVING THE CIRCULATORY AND RESPIRATORY SYSTEMS: ICD-10-CM

## 2023-11-08 DIAGNOSIS — G45.9 TIA (TRANSIENT ISCHEMIC ATTACK): ICD-10-CM

## 2023-11-08 PROCEDURE — 93246 EXT ECG>7D<15D RECORDING: CPT

## 2023-11-08 PROCEDURE — 93880 EXTRACRANIAL BILAT STUDY: CPT

## 2023-11-08 PROCEDURE — 93246 EXT ECG>7D<15D RECORDING: CPT | Performed by: INTERNAL MEDICINE

## 2023-11-08 PROCEDURE — 93880 EXTRACRANIAL BILAT STUDY: CPT | Performed by: INTERNAL MEDICINE

## 2023-11-09 ENCOUNTER — OFFICE VISIT (OUTPATIENT)
Dept: PRIMARY CARE | Facility: CLINIC | Age: 78
End: 2023-11-09
Payer: MEDICARE

## 2023-11-09 VITALS
HEART RATE: 81 BPM | WEIGHT: 130.2 LBS | DIASTOLIC BLOOD PRESSURE: 72 MMHG | TEMPERATURE: 97.4 F | HEIGHT: 64 IN | OXYGEN SATURATION: 96 % | SYSTOLIC BLOOD PRESSURE: 132 MMHG | BODY MASS INDEX: 22.23 KG/M2

## 2023-11-09 DIAGNOSIS — Z00.00 MEDICARE ANNUAL WELLNESS VISIT, SUBSEQUENT: Primary | ICD-10-CM

## 2023-11-09 DIAGNOSIS — I10 HTN (HYPERTENSION), BENIGN: ICD-10-CM

## 2023-11-09 DIAGNOSIS — E78.2 MIXED HYPERLIPIDEMIA: ICD-10-CM

## 2023-11-09 PROCEDURE — 3075F SYST BP GE 130 - 139MM HG: CPT | Performed by: STUDENT IN AN ORGANIZED HEALTH CARE EDUCATION/TRAINING PROGRAM

## 2023-11-09 PROCEDURE — 99397 PER PM REEVAL EST PAT 65+ YR: CPT | Performed by: STUDENT IN AN ORGANIZED HEALTH CARE EDUCATION/TRAINING PROGRAM

## 2023-11-09 PROCEDURE — 1170F FXNL STATUS ASSESSED: CPT | Performed by: STUDENT IN AN ORGANIZED HEALTH CARE EDUCATION/TRAINING PROGRAM

## 2023-11-09 PROCEDURE — 1159F MED LIST DOCD IN RCRD: CPT | Performed by: STUDENT IN AN ORGANIZED HEALTH CARE EDUCATION/TRAINING PROGRAM

## 2023-11-09 PROCEDURE — 1036F TOBACCO NON-USER: CPT | Performed by: STUDENT IN AN ORGANIZED HEALTH CARE EDUCATION/TRAINING PROGRAM

## 2023-11-09 PROCEDURE — G0439 PPPS, SUBSEQ VISIT: HCPCS | Performed by: STUDENT IN AN ORGANIZED HEALTH CARE EDUCATION/TRAINING PROGRAM

## 2023-11-09 PROCEDURE — 3078F DIAST BP <80 MM HG: CPT | Performed by: STUDENT IN AN ORGANIZED HEALTH CARE EDUCATION/TRAINING PROGRAM

## 2023-11-09 PROCEDURE — 1160F RVW MEDS BY RX/DR IN RCRD: CPT | Performed by: STUDENT IN AN ORGANIZED HEALTH CARE EDUCATION/TRAINING PROGRAM

## 2023-11-09 PROCEDURE — 1126F AMNT PAIN NOTED NONE PRSNT: CPT | Performed by: STUDENT IN AN ORGANIZED HEALTH CARE EDUCATION/TRAINING PROGRAM

## 2023-11-09 ASSESSMENT — ENCOUNTER SYMPTOMS
WHEEZING: 0
NAUSEA: 0
ABDOMINAL PAIN: 0
NERVOUS/ANXIOUS: 0
DYSURIA: 0
FEVER: 0
NUMBNESS: 0
FATIGUE: 0
COUGH: 0
HEADACHES: 0
CHILLS: 0
CONSTIPATION: 0
HEMATURIA: 0
FREQUENCY: 0
SHORTNESS OF BREATH: 0
DIARRHEA: 0
DIZZINESS: 0
DYSPHORIC MOOD: 0
PALPITATIONS: 0

## 2023-11-09 ASSESSMENT — ACTIVITIES OF DAILY LIVING (ADL)
MANAGING_FINANCES: INDEPENDENT
GROCERY_SHOPPING: INDEPENDENT
TAKING_MEDICATION: INDEPENDENT
DOING_HOUSEWORK: INDEPENDENT
DRESSING: INDEPENDENT
BATHING: INDEPENDENT

## 2023-11-09 NOTE — PATIENT INSTRUCTIONS
Recommendations for women annual wellness exam:   Make sure screenings for cervical, breast, and colon cancer are up to date if applicable- pap smears age 21-65, discuss mammogram starting at age 40, colonoscopy at age 45, earlier if positive family history for breast or colon cancer   Screen for osteoporosis with DXA bone scan starting at age 65 or sooner if risk factors present (long term steroid use, smoking, heavy alcohol use, history of fracture, rheumatoid arthritis, low body weight, family history of hip fracture)  Screening for lung cancer with low-dose CT in all adults age 55-77 who have a 30 pack-year smoking history and currently smoke or have quit within the past 15 years  Follow a healthy diet (Dash diet, Mediterranean diet)  Exercise 150 min/wk   Maintain healthy weight (BMI < 25)   Do not smoke   Alcohol in moderation (up to 1 drink/day)  Get enough sleep (7-8 hours/night)  Make sure immunizations are up to date (influenza, pneumococcal, Tdap, covid, shingles if age > 50, RSV if >60)  Postmenopausal women or women with osteoporosis need minimum 1,200 mg calcium and 800 IU vitamin D daily  Talk to your physician if you have concerns about depression or anxiety  Visit dentist twice yearly

## 2023-11-09 NOTE — PROGRESS NOTES
Subjective   Reason for Visit: Deena Pop is an 77 y.o. female here for a Medicare Wellness visit.     Past Medical, Surgical, and Family History reviewed and updated in chart.    Reviewed all medications by prescribing practitioner or clinical pharmacist (such as prescriptions, OTCs, herbal therapies and supplements) and documented in the medical record.    HPI  Specialists seen by patient: Podiatry: Dr Xiao  -ENT: [facility]  -ophthalmology  -dentist  -derm    Last pap/cervical cancer screening: n/a  Last mammogram: approximate date 2022 and was normal  Hx of colon ca screening:  n/a  Hx of DXA: yes, doesn't want one right now  History of depression or anxiety:no  Immunizations: not up to date - bad reaction to tdap, planning to get shingrix  Diet: Follows a healthy diet  Exercise: Gets regular exercise, [employer], walks   Alcohol abuse screen:   none   How many times in the past year 4 or more drinks in a day? 0  Lung cancer screening:   Smoking history: never a smoker  Drug use: No    Currently has her Holter monitor on.  She had a recent echo which looked good as well as carotid artery ultrasound.  She sees neurology next month after her TIA.    Patient Care Team:  Radha Armstrong DO as PCP - General (Family Medicine)  aRdha Armstrong DO as PCP - O Medicare Advantage PCP     Review of Systems   Constitutional:  Negative for chills, fatigue and fever.   Respiratory:  Negative for cough, shortness of breath and wheezing.    Cardiovascular:  Negative for chest pain, palpitations and leg swelling.   Gastrointestinal:  Negative for abdominal pain, constipation, diarrhea and nausea.   Genitourinary:  Negative for dysuria, frequency, hematuria and urgency.   Neurological:  Negative for dizziness, numbness and headaches.   Psychiatric/Behavioral:  Negative for dysphoric mood. The patient is not nervous/anxious.        Objective   Vitals:  /72 (BP Location: Left arm, Patient Position: Sitting, BP  "Cuff Size: Adult)   Pulse 81   Temp 36.3 °C (97.4 °F) (Temporal)   Ht 1.626 m (5' 4\")   Wt 59.1 kg (130 lb 3.2 oz)   SpO2 96%   BMI 22.35 kg/m²       Physical Exam  Constitutional:       General: She is not in acute distress.     Appearance: Normal appearance.   HENT:      Head: Normocephalic and atraumatic.      Right Ear: Tympanic membrane and ear canal normal.      Left Ear: Tympanic membrane and ear canal normal.      Mouth/Throat:      Mouth: Mucous membranes are moist.      Pharynx: No posterior oropharyngeal erythema.   Eyes:      Extraocular Movements: Extraocular movements intact.      Pupils: Pupils are equal, round, and reactive to light.   Cardiovascular:      Rate and Rhythm: Normal rate and regular rhythm.      Heart sounds: No murmur heard.  Pulmonary:      Effort: Pulmonary effort is normal. No respiratory distress.      Breath sounds: Normal breath sounds. No wheezing.   Abdominal:      General: Bowel sounds are normal.      Palpations: Abdomen is soft.      Tenderness: There is no abdominal tenderness. There is no guarding.   Musculoskeletal:         General: Normal range of motion.      Cervical back: Neck supple.   Skin:     General: Skin is warm and dry.   Neurological:      General: No focal deficit present.      Mental Status: She is alert and oriented to person, place, and time.   Psychiatric:         Mood and Affect: Mood normal.         Behavior: Behavior normal.         Assessment/Plan   Problem List Items Addressed This Visit       Hyperlipidemia    Relevant Orders    Lipid Panel    Hepatic function panel    HTN (hypertension), benign     Other Visit Diagnoses       Medicare annual wellness visit, subsequent    -  Primary          We will obtain fasting blood work.  Results will be communicated to the patient via MyChart or a letter.   We reviewed appropriate preventative health screening guidelines. The patient is not up-to-date on vaccinations, recommended shingles vaccines.  She " is going to think about if she wants to continue mammograms.  She may continue them every other year.  We discussed regular aerobic exercise. We discussed proper nutrition and weight control.

## 2023-11-10 ENCOUNTER — LAB (OUTPATIENT)
Dept: LAB | Facility: LAB | Age: 78
End: 2023-11-10
Payer: MEDICARE

## 2023-11-10 DIAGNOSIS — E78.2 MIXED HYPERLIPIDEMIA: ICD-10-CM

## 2023-11-10 LAB
ALBUMIN SERPL BCP-MCNC: 4.4 G/DL (ref 3.4–5)
ALP SERPL-CCNC: 51 U/L (ref 33–136)
ALT SERPL W P-5'-P-CCNC: 25 U/L (ref 7–45)
AST SERPL W P-5'-P-CCNC: 23 U/L (ref 9–39)
BILIRUB DIRECT SERPL-MCNC: 0.2 MG/DL (ref 0–0.3)
BILIRUB SERPL-MCNC: 0.6 MG/DL (ref 0–1.2)
CHOLEST SERPL-MCNC: 132 MG/DL (ref 0–199)
CHOLESTEROL/HDL RATIO: 2
HDLC SERPL-MCNC: 64.8 MG/DL
LDLC SERPL CALC-MCNC: 49 MG/DL
NON HDL CHOLESTEROL: 67 MG/DL (ref 0–149)
PROT SERPL-MCNC: 6.8 G/DL (ref 6.4–8.2)
TRIGL SERPL-MCNC: 89 MG/DL (ref 0–149)
VLDL: 18 MG/DL (ref 0–40)

## 2023-11-10 PROCEDURE — 36415 COLL VENOUS BLD VENIPUNCTURE: CPT

## 2023-11-10 PROCEDURE — 80061 LIPID PANEL: CPT

## 2023-11-10 PROCEDURE — 80076 HEPATIC FUNCTION PANEL: CPT

## 2023-12-04 ENCOUNTER — OFFICE VISIT (OUTPATIENT)
Dept: NEUROLOGY | Facility: CLINIC | Age: 78
End: 2023-12-04
Payer: MEDICARE

## 2023-12-04 VITALS
WEIGHT: 130 LBS | HEART RATE: 64 BPM | SYSTOLIC BLOOD PRESSURE: 150 MMHG | TEMPERATURE: 97.3 F | BODY MASS INDEX: 22.2 KG/M2 | DIASTOLIC BLOOD PRESSURE: 90 MMHG | HEIGHT: 64 IN | RESPIRATION RATE: 18 BRPM

## 2023-12-04 DIAGNOSIS — Z86.73 H/O STROKE ASSOCIATED WITH DEHYDRATION: ICD-10-CM

## 2023-12-04 DIAGNOSIS — Z86.39 H/O STROKE ASSOCIATED WITH DEHYDRATION: ICD-10-CM

## 2023-12-04 DIAGNOSIS — G45.9 TIA (TRANSIENT ISCHEMIC ATTACK): Primary | ICD-10-CM

## 2023-12-04 PROCEDURE — 3080F DIAST BP >= 90 MM HG: CPT | Performed by: PSYCHIATRY & NEUROLOGY

## 2023-12-04 PROCEDURE — 1036F TOBACCO NON-USER: CPT | Performed by: PSYCHIATRY & NEUROLOGY

## 2023-12-04 PROCEDURE — 3077F SYST BP >= 140 MM HG: CPT | Performed by: PSYCHIATRY & NEUROLOGY

## 2023-12-04 PROCEDURE — 1159F MED LIST DOCD IN RCRD: CPT | Performed by: PSYCHIATRY & NEUROLOGY

## 2023-12-04 PROCEDURE — 1160F RVW MEDS BY RX/DR IN RCRD: CPT | Performed by: PSYCHIATRY & NEUROLOGY

## 2023-12-04 PROCEDURE — 1126F AMNT PAIN NOTED NONE PRSNT: CPT | Performed by: PSYCHIATRY & NEUROLOGY

## 2023-12-04 PROCEDURE — 99205 OFFICE O/P NEW HI 60 MIN: CPT | Performed by: PSYCHIATRY & NEUROLOGY

## 2023-12-04 ASSESSMENT — ENCOUNTER SYMPTOMS
EYE PAIN: 0
FEVER: 0
HEADACHES: 0
DIZZINESS: 1
HALLUCINATIONS: 0

## 2023-12-04 NOTE — PATIENT INSTRUCTIONS
"It was a pleasure seeing you today.     Obtain bloodwork and I want you to get a MRI Brain- Please call radiology to schedule at [phone].   If the results are abnormal, I will call you to discuss.      Please make a follow up appointment as needed.     For any urgent issues or needing to speak to a medical assistant please call [phone], option 6 during our office hours Monday-Friday 8am-4pm, and leave a voicemail with your concern.  My office will try to reach back you as soon as possible within 24 (business) hours.  If you have an emergency please call 911 or visit a local urgent care or nearest emergency room.      Please understand that [employer] is a useful communication tool for simple \"normal\" results or a refill request but I would not recommend using this tool for emergent or urgent issues or for conversations with me.  I am happy to ask my staff to rearrange a follow up visit or a virtual visit sooner than requested if appropriate for your care.     "

## 2023-12-04 NOTE — PROGRESS NOTES
Consultation:  Subjective     Deena Pop is a 78 y.o. year old female here for dizziness/ TIA consult.     HPI  Was in the ER 10-13 for acute onset of vertigo when going to bed that lasted about 8-12 hours. No nausea / vomiting. No headaches.   Hx of SVT, had stroke years ago in 2017 had cerebellar stroke (  [place]) the MRA showed the atherosclerotic disease. She has had no symptoms since then.   She gets avastin in her retinal disease on her R eye.   She is on ASA 81mg daily and lipitor 40mg daily.   Carotid US and cT head were done and personally reviewed- were unremarkable.  LDL 49.   Heart monitor showed no AFIB.   FH: father had enlarged heart, her mother broke her hip in 70s.   1 brother, estranged.     Review of Systems   Constitutional:  Negative for fever.   HENT:  Negative for ear pain.    Eyes:  Negative for pain.   Neurological:  Positive for dizziness. Negative for syncope and headaches.   Psychiatric/Behavioral:  Negative for hallucinations and self-injury.    All other systems reviewed and are negative.      Patient Active Problem List   Diagnosis    Hyperlipidemia    Osteoporosis    HTN (hypertension), benign    Shingles     Past Medical History:   Diagnosis Date    Cataract     HL (hearing loss)     Osteoporosis     Stroke (CMS/HCC)     Visual impairment      Past Surgical History:   Procedure Laterality Date    ADENOIDECTOMY       SECTION, LOW TRANSVERSE      EYE SURGERY      HERNIA REPAIR      TONSILLECTOMY      TUBAL LIGATION      WISDOM TOOTH EXTRACTION       Social History     Tobacco Use    Smoking status: Never     Passive exposure: Past    Smokeless tobacco: Never   Substance Use Topics    Alcohol use: Yes     Alcohol/week: 2.0 standard drinks of alcohol     Types: 1 Cans of beer, 1 Standard drinks or equivalent per week     family history includes Colon cancer in her mother's brother; Hypertension in her father and mother; Stroke in her maternal grandmother.    Current  Outpatient Medications:     aspirin 81 mg EC tablet, Take 1 tablet (81 mg) by mouth once daily., Disp: , Rfl:     atorvastatin (Lipitor) 40 mg tablet, Take 1 tablet (40 mg) by mouth once daily. At bedtime, Disp: 90 tablet, Rfl: 1    b complex vitamins (Vitamins B Complex) capsule, Take 1 capsule by mouth once daily., Disp: , Rfl:     calcium carbonate 600 mg calcium (1,500 mg) tablet, Take 1 tablet (600 mg) by mouth once daily., Disp: , Rfl:     cholecalciferol (Vitamin D-3) 125 MCG (5000 UT) capsule, Take 1 capsule (5,000 Units) by mouth once daily., Disp: , Rfl:     famotidine (Pepcid) 20 mg tablet, Take 1 tablet (20 mg) by mouth as needed at bedtime for indigestion., Disp: , Rfl:     metoprolol succinate XL (Toprol-XL) 25 mg 24 hr tablet, TAKE 1 TABLET DAILY (DO NOT CRUSH OR CHEW), Disp: 90 tablet, Rfl: 0  Allergies   Allergen Reactions    Sulfa (Sulfonamide Antibiotics) Unknown     There were no vitals taken for this visit.  Neurological Exam/Physical Exam:    Constitutional: General appearance: no acute distress. Pleasant.   Auscultation of Heart: Regular rate and rhythm, no murmurs, normal S1 and S2.   Carotid Arteries: no bruits  Peripheral Vascular Exam: No swelling, edema or tenderness to palpation in extremities  Mental status: no distress, alert, interactive and cooperative. Affect is appropriate.   Orientation: oriented to person, oriented to place and oriented to time.   Memory: recent memory intact and remote memory intact.   Attention: normal attention /concentration.   Language: normal comprehension and naming.   Fund of knowledge: Patient displays adequate knowledge of current events.  Eyes: The ophthalmoscopic examination was normal.   Cranial nerve II: Visual fields full to confrontation.   Cranial nerves III, IV, and VI: Pupils round, equally reactive to light; EOMs intact. No nystagmus.   Cranial Nerve V: Facial sensation intact to LT bilaterally.   Cranial nerve VII: no facial droop  Cranial  nerve VIII: Hearing is intact  Cranial nerves IX and X: Palate elevates symmetrically.   Cranial nerve XI: Shoulder shrug intact.   Cranial nerve XII: Tongue is midline.  Motor:  Muscle bulk was normal in both upper and lower extremities.    No atrophy.   Strength is normal.   Deep Tendon Reflexes: left biceps 2+ , right biceps 2+, left triceps 2+, right triceps 2+, left brachioradialis 2+, right brachioradialis 2+, left patella 2+, right patella 2+, left ankle jerk 2+, right ankle jerk 2+   Plantar Reflex: Toes downgoing to plantar stimulation on the left. Toes downgoing to plantar stimulation on the right.   Sensory Exam: Normal to vibratory sensation  Coordination:  no limb dystaxia and rapid alternating movements are intact.   Gait:  cautious.         Labs:  CBC:   Lab Results   Component Value Date    WBC 5.8 10/13/2023    HGB 15.9 10/13/2023    HCT 47.2 (H) 10/13/2023     10/13/2023     BMP:   Lab Results   Component Value Date     10/13/2023    K 4.2 10/13/2023     10/13/2023    CO2 28 10/13/2023    BUN 19 10/13/2023    CREATININE 0.87 10/13/2023    CALCIUM 10.3 10/13/2023    MG 2.14 10/13/2023     LFT:   Lab Results   Component Value Date    ALKPHOS 51 11/10/2023    BILITOT 0.6 11/10/2023    BILIDIR 0.2 11/10/2023    PROT 6.8 11/10/2023    ALBUMIN 4.4 11/10/2023    ALT 25 11/10/2023    AST 23 11/10/2023         Assessment/Plan   Problem List Items Addressed This Visit    None  Visit Diagnoses         Codes    TIA (transient ischemic attack)    -  Primary G45.9    Relevant Orders    Hemoglobin A1C    MR brain wo IV contrast    H/O stroke associated with dehydration     Z86.73, Z86.39    Relevant Orders    Hemoglobin A1C        No change to medications.   Obtain MRI brain and hga1c .  Monitor BP ( may be intracranial stenosis / BP changes and old stroke)

## 2023-12-08 ENCOUNTER — LAB (OUTPATIENT)
Dept: LAB | Facility: LAB | Age: 78
End: 2023-12-08
Payer: MEDICARE

## 2023-12-08 DIAGNOSIS — G45.9 TIA (TRANSIENT ISCHEMIC ATTACK): ICD-10-CM

## 2023-12-08 DIAGNOSIS — Z86.73 H/O STROKE ASSOCIATED WITH DEHYDRATION: ICD-10-CM

## 2023-12-08 DIAGNOSIS — Z86.39 H/O STROKE ASSOCIATED WITH DEHYDRATION: ICD-10-CM

## 2023-12-08 PROCEDURE — 83036 HEMOGLOBIN GLYCOSYLATED A1C: CPT

## 2023-12-08 PROCEDURE — 36415 COLL VENOUS BLD VENIPUNCTURE: CPT

## 2023-12-09 LAB
EST. AVERAGE GLUCOSE BLD GHB EST-MCNC: 117 MG/DL
HBA1C MFR BLD: 5.7 %

## 2023-12-22 ENCOUNTER — HOSPITAL ENCOUNTER (OUTPATIENT)
Dept: RADIOLOGY | Facility: HOSPITAL | Age: 78
Discharge: HOME | End: 2023-12-22
Payer: MEDICARE

## 2023-12-22 DIAGNOSIS — G45.9 TIA (TRANSIENT ISCHEMIC ATTACK): ICD-10-CM

## 2023-12-22 PROCEDURE — 70551 MRI BRAIN STEM W/O DYE: CPT

## 2023-12-22 PROCEDURE — 70551 MRI BRAIN STEM W/O DYE: CPT | Performed by: RADIOLOGY

## 2023-12-29 DIAGNOSIS — I10 HTN (HYPERTENSION), BENIGN: ICD-10-CM

## 2024-01-02 RX ORDER — METOPROLOL SUCCINATE 25 MG/1
TABLET, EXTENDED RELEASE ORAL
Qty: 90 TABLET | Refills: 1 | Status: SHIPPED | OUTPATIENT
Start: 2024-01-02 | End: 2024-05-14 | Stop reason: SDUPTHER

## 2024-01-05 ENCOUNTER — OFFICE VISIT (OUTPATIENT)
Dept: PODIATRY | Facility: CLINIC | Age: 79
End: 2024-01-05
Payer: MEDICARE

## 2024-01-05 VITALS
HEART RATE: 79 BPM | TEMPERATURE: 97.2 F | BODY MASS INDEX: 21.66 KG/M2 | HEIGHT: 65 IN | RESPIRATION RATE: 16 BRPM | OXYGEN SATURATION: 96 % | WEIGHT: 130 LBS

## 2024-01-05 DIAGNOSIS — M79.675 TOE PAIN, LEFT: ICD-10-CM

## 2024-01-05 DIAGNOSIS — L84 CORNS AND CALLOSITIES: ICD-10-CM

## 2024-01-05 DIAGNOSIS — M79.672 LEFT FOOT PAIN: ICD-10-CM

## 2024-01-05 DIAGNOSIS — M79.674 TOE PAIN, RIGHT: ICD-10-CM

## 2024-01-05 DIAGNOSIS — M20.42 HAMMERTOE OF LEFT FOOT: Primary | ICD-10-CM

## 2024-01-05 DIAGNOSIS — B35.1 TINEA UNGUIUM: ICD-10-CM

## 2024-01-05 PROCEDURE — 11721 DEBRIDE NAIL 6 OR MORE: CPT | Performed by: PODIATRIST

## 2024-01-05 PROCEDURE — 1126F AMNT PAIN NOTED NONE PRSNT: CPT | Performed by: PODIATRIST

## 2024-01-05 PROCEDURE — 99212 OFFICE O/P EST SF 10 MIN: CPT | Performed by: PODIATRIST

## 2024-01-05 PROCEDURE — 1159F MED LIST DOCD IN RCRD: CPT | Performed by: PODIATRIST

## 2024-01-05 PROCEDURE — 1036F TOBACCO NON-USER: CPT | Performed by: PODIATRIST

## 2024-01-05 PROCEDURE — 11055 PARING/CUTG B9 HYPRKER LES 1: CPT | Performed by: PODIATRIST

## 2024-02-05 ENCOUNTER — TELEMEDICINE (OUTPATIENT)
Dept: NEUROLOGY | Facility: CLINIC | Age: 79
End: 2024-02-05
Payer: MEDICARE

## 2024-02-05 DIAGNOSIS — G31.84 MCI (MILD COGNITIVE IMPAIRMENT) WITH MEMORY LOSS: ICD-10-CM

## 2024-02-05 DIAGNOSIS — Z86.73 HISTORY OF STROKE: Primary | ICD-10-CM

## 2024-02-05 PROCEDURE — 99442 PR PHYS/QHP TELEPHONE EVALUATION 11-20 MIN: CPT | Performed by: PSYCHIATRY & NEUROLOGY

## 2024-02-05 NOTE — PROGRESS NOTES
Subjective     Deena Pop is a 78 y.o. year old female here as a phone call to review her MRI brain results.    HPI  I was able to personally review her MRI brain which showed age-related diffuse volume loss as well as moderate amount of chronic small vessel disease and prior strokes.  She had no new stroke.  She has a stable unchanged left parietal cavernous hemangioma.  Patient feels that her memory has been off and not good for many years.  She had 1 episode in the fall where she just did not remember receiving a check from the bank which she thought was very unusual for her.  She is quite embarrassed about this.  She overall is functioning very well.  She is taking aspirin and Lipitor.  Her LDL was less than 70 and her hemoglobin A1c was 5.7 recently.  She has no new neurologic symptoms at this stage.  Review of Systems    Patient Active Problem List   Diagnosis    Hyperlipidemia    Osteoporosis    HTN (hypertension), benign    Shingles     Past Medical History:   Diagnosis Date    Cataract     HL (hearing loss)     Osteoporosis     Stroke (CMS/HCC)     Visual impairment      Past Surgical History:   Procedure Laterality Date    ADENOIDECTOMY       SECTION, LOW TRANSVERSE      EYE SURGERY      HERNIA REPAIR      TONSILLECTOMY      TUBAL LIGATION      WISDOM TOOTH EXTRACTION       Social History     Tobacco Use    Smoking status: Never     Passive exposure: Past    Smokeless tobacco: Never   Substance Use Topics    Alcohol use: Yes     Alcohol/week: 2.0 standard drinks of alcohol     Types: 1 Cans of beer, 1 Standard drinks or equivalent per week     Comment: very rare     family history includes Colon cancer in her mother's brother; Hypertension in her father and mother; Stroke in her maternal grandmother.    Current Outpatient Medications:     aspirin 81 mg EC tablet, Take 1 tablet (81 mg) by mouth once daily., Disp: , Rfl:     atorvastatin (Lipitor) 40 mg tablet, Take 1 tablet (40 mg) by mouth  once daily. At bedtime, Disp: 90 tablet, Rfl: 1    b complex vitamins (Vitamins B Complex) capsule, Take 1 capsule by mouth once daily., Disp: , Rfl:     calcium carbonate 600 mg calcium (1,500 mg) tablet, Take 1 tablet (1,500 mg) by mouth once daily., Disp: , Rfl:     cholecalciferol (Vitamin D-3) 125 MCG (5000 UT) capsule, Take 1 capsule (125 mcg) by mouth once daily., Disp: , Rfl:     famotidine (Pepcid) 20 mg tablet, Take 1 tablet (20 mg) by mouth as needed at bedtime for indigestion., Disp: , Rfl:     metoprolol succinate XL (Toprol-XL) 25 mg 24 hr tablet, TAKE 1 TABLET DAILY (DO NOT CRUSH OR CHEW), Disp: 90 tablet, Rfl: 1  Allergies   Allergen Reactions    Sulfa (Sulfonamide Antibiotics) Unknown       Neurological Exam/Physical Exam:  alert, oriented.speech is clear, fluent.  appropriate, conversational.          Labs:  CBC:   Lab Results   Component Value Date    WBC 5.8 10/13/2023    HGB 15.9 10/13/2023    HCT 47.2 (H) 10/13/2023     10/13/2023     BMP:   Lab Results   Component Value Date     10/13/2023    K 4.2 10/13/2023     10/13/2023    CO2 28 10/13/2023    BUN 19 10/13/2023    CREATININE 0.87 10/13/2023    CALCIUM 10.3 10/13/2023    MG 2.14 10/13/2023     LFT:   Lab Results   Component Value Date    ALKPHOS 51 11/10/2023    BILITOT 0.6 11/10/2023    BILIDIR 0.2 11/10/2023    PROT 6.8 11/10/2023    ALBUMIN 4.4 11/10/2023    ALT 25 11/10/2023    AST 23 11/10/2023          Assessment/Plan   Problem List Items Addressed This Visit    None  Visit Diagnoses         Codes    History of stroke    -  Primary Z86.73    MCI (mild cognitive impairment) with memory loss     G31.84         I advised her to continue her medications.   She should monitor her BP 1-2x/weekly and report to her PCP.  Stay on ASA 81mg and statin.  She can see me again in the spring for follow up for memory issues. Appears that his is MCI, likely normal aging.   Total time with patient 12 min.

## 2024-02-08 ENCOUNTER — OFFICE VISIT (OUTPATIENT)
Dept: NEUROLOGY | Facility: CLINIC | Age: 79
End: 2024-02-08
Payer: MEDICARE

## 2024-02-08 VITALS
HEIGHT: 65 IN | DIASTOLIC BLOOD PRESSURE: 79 MMHG | BODY MASS INDEX: 21.89 KG/M2 | TEMPERATURE: 97.3 F | HEART RATE: 66 BPM | SYSTOLIC BLOOD PRESSURE: 130 MMHG | RESPIRATION RATE: 18 BRPM | WEIGHT: 131.4 LBS

## 2024-02-08 DIAGNOSIS — I67.82 ISCHEMIC CHANGES ON HEAD CT: Primary | ICD-10-CM

## 2024-02-08 DIAGNOSIS — R42 DIZZINESS: ICD-10-CM

## 2024-02-08 PROCEDURE — 1159F MED LIST DOCD IN RCRD: CPT | Performed by: PSYCHIATRY & NEUROLOGY

## 2024-02-08 PROCEDURE — 1160F RVW MEDS BY RX/DR IN RCRD: CPT | Performed by: PSYCHIATRY & NEUROLOGY

## 2024-02-08 PROCEDURE — 99214 OFFICE O/P EST MOD 30 MIN: CPT | Performed by: PSYCHIATRY & NEUROLOGY

## 2024-02-08 PROCEDURE — 3078F DIAST BP <80 MM HG: CPT | Performed by: PSYCHIATRY & NEUROLOGY

## 2024-02-08 PROCEDURE — 1036F TOBACCO NON-USER: CPT | Performed by: PSYCHIATRY & NEUROLOGY

## 2024-02-08 PROCEDURE — 3075F SYST BP GE 130 - 139MM HG: CPT | Performed by: PSYCHIATRY & NEUROLOGY

## 2024-02-08 PROCEDURE — 1126F AMNT PAIN NOTED NONE PRSNT: CPT | Performed by: PSYCHIATRY & NEUROLOGY

## 2024-02-08 PROCEDURE — 1157F ADVNC CARE PLAN IN RCRD: CPT | Performed by: PSYCHIATRY & NEUROLOGY

## 2024-02-08 ASSESSMENT — ENCOUNTER SYMPTOMS
FEVER: 0
HALLUCINATIONS: 0
HEADACHES: 0
EYE PAIN: 0
DIZZINESS: 1

## 2024-02-08 NOTE — PROGRESS NOTES
Consultation:  Subjective     Deena Pop is a 78 y.o. year old female here for dizziness/ TIA follow up.    HPI  MRI brain results were reviewed with her over the phone few days ago.  She reports continued dizziness.  Hx of SVT, had stroke years ago in 2017 had cerebellar stroke (  [place]) the MRA showed the atherosclerotic disease. She has had no symptoms since then.   She is on ASA 81mg daily and lipitor 40mg daily.   Carotid US and cT head were unremarkable.  LDL 49.   Heart monitor showed no AFIB.   FH: father had enlarged heart, her mother broke her hip in 70s.   A1c 5.7.  She has concerns of memory loss.   Review of Systems   Constitutional:  Negative for fever.   HENT:  Negative for ear pain.    Eyes:  Negative for pain.   Neurological:  Positive for dizziness. Negative for syncope and headaches.   Psychiatric/Behavioral:  Negative for hallucinations and self-injury.    All other systems reviewed and are negative.      Patient Active Problem List   Diagnosis    Hyperlipidemia    Osteoporosis    HTN (hypertension), benign    Shingles     Past Medical History:   Diagnosis Date    Cataract     HL (hearing loss)     Osteoporosis     Stroke (CMS/HCC)     Visual impairment      Past Surgical History:   Procedure Laterality Date    ADENOIDECTOMY       SECTION, LOW TRANSVERSE      EYE SURGERY      HERNIA REPAIR      TONSILLECTOMY      TUBAL LIGATION      WISDOM TOOTH EXTRACTION       Social History     Tobacco Use    Smoking status: Never     Passive exposure: Past    Smokeless tobacco: Never   Substance Use Topics    Alcohol use: Yes     Alcohol/week: 2.0 standard drinks of alcohol     Types: 1 Cans of beer, 1 Standard drinks or equivalent per week     Comment: very rare     family history includes Colon cancer in her mother's brother; Hypertension in her father and mother; Stroke in her maternal grandmother.    Current Outpatient Medications:     aspirin 81 mg EC tablet, Take 1 tablet (81 mg) by  mouth once daily., Disp: , Rfl:     atorvastatin (Lipitor) 40 mg tablet, Take 1 tablet (40 mg) by mouth once daily. At bedtime, Disp: 90 tablet, Rfl: 1    b complex vitamins (Vitamins B Complex) capsule, Take 1 capsule by mouth once daily., Disp: , Rfl:     calcium carbonate 600 mg calcium (1,500 mg) tablet, Take 1 tablet (1,500 mg) by mouth once daily., Disp: , Rfl:     cholecalciferol (Vitamin D-3) 125 MCG (5000 UT) capsule, Take 1 capsule (125 mcg) by mouth once daily., Disp: , Rfl:     famotidine (Pepcid) 20 mg tablet, Take 1 tablet (20 mg) by mouth as needed at bedtime for indigestion., Disp: , Rfl:     metoprolol succinate XL (Toprol-XL) 25 mg 24 hr tablet, TAKE 1 TABLET DAILY (DO NOT CRUSH OR CHEW), Disp: 90 tablet, Rfl: 1  Allergies   Allergen Reactions    Sulfa (Sulfonamide Antibiotics) Unknown     There were no vitals taken for this visit.  Neurological Exam/Physical Exam:    Constitutional: General appearance: no acute distress. Pleasant.   Auscultation of Heart: Regular rate and rhythm, no murmurs, normal S1 and S2.   Carotid Arteries: no bruits  Peripheral Vascular Exam: No swelling, edema or tenderness to palpation in extremities  Mental status: no distress, alert, interactive and cooperative. Affect is appropriate.   Orientation: oriented to person, oriented to place and oriented to time.   Memory: recent memory intact and remote memory intact.   Attention: normal attention /concentration.   Language: normal comprehension and naming.   Fund of knowledge: Patient displays adequate knowledge of current events.  Eyes: The ophthalmoscopic examination was normal.   Cranial nerve II: Visual fields full to confrontation.   Cranial nerves III, IV, and VI: Pupils round, equally reactive to light; EOMs intact. No nystagmus.   Cranial Nerve V: Facial sensation intact to LT bilaterally.   Cranial nerve VII: no facial droop  Cranial nerve VIII: Hearing is intact  Cranial nerves IX and X: Palate elevates  symmetrically.   Cranial nerve XI: Shoulder shrug intact.   Cranial nerve XII: Tongue is midline.  Motor:  Muscle bulk was normal in both upper and lower extremities.    No atrophy.   Strength is normal.   Deep Tendon Reflexes: left biceps 2+ , right biceps 2+, left triceps 2+, right triceps 2+, left brachioradialis 2+, right brachioradialis 2+, left patella 2+, right patella 2+, left ankle jerk 2+, right ankle jerk 2+   Plantar Reflex: Toes downgoing to plantar stimulation on the left. Toes downgoing to plantar stimulation on the right.   Sensory Exam: Normal to vibratory sensation  Coordination:  no limb dystaxia and rapid alternating movements are intact.   Gait:  cautious.         Labs:  CBC:   Lab Results   Component Value Date    WBC 5.8 10/13/2023    HGB 15.9 10/13/2023    HCT 47.2 (H) 10/13/2023     10/13/2023     BMP:   Lab Results   Component Value Date     10/13/2023    K 4.2 10/13/2023     10/13/2023    CO2 28 10/13/2023    BUN 19 10/13/2023    CREATININE 0.87 10/13/2023    CALCIUM 10.3 10/13/2023    MG 2.14 10/13/2023     LFT:   Lab Results   Component Value Date    ALKPHOS 51 11/10/2023    BILITOT 0.6 11/10/2023    BILIDIR 0.2 11/10/2023    PROT 6.8 11/10/2023    ALBUMIN 4.4 11/10/2023    ALT 25 11/10/2023    AST 23 11/10/2023     MRI brain shows diffuse volume loss and chronic small vessel disease. Prior unchanged cavernous angioma in L temporal lobe.  B12 and TSH WNL.     Assessment/Plan   Problem List Items Addressed This Visit    None  Visit Diagnoses         Codes    Ischemic changes on head CT    -  Primary I67.82    Dizziness     R42          Monitor BP ( may be intracranial stenosis / BP changes and old stroke).  Continue current regimen.   MCI - normal aging .  Sleep may be affecting memory.

## 2024-02-08 NOTE — PATIENT INSTRUCTIONS
It was a pleasure seeing you today.     To help sleep take Melatonin 5mg 1 tab 2 hours before bedtime. Try for one week, if no improvement increase to 10 mg at night.  If this is not helpful in one more week increase to 15 mg at night (this is over the counter).     Structural and organizational reinforcements are often helpful for individuals with memory complaints and executive difficulties.  from the following strategies:   a. Following a routine and consistent daily schedule.    b. Continuing to use a single planner, calendar, or electronic organizer to plan and manage appointment dates, activities, and tasks.   c. Working on one task at a time until it is completed.   d. Keeping a single list of tasks, listed by priority, and marking them when complete.   e. Placing reminders in places where they are easily noticed (e.g., a note on door).   f. Using a mobile phone or electronic calendar to set timers for tasks and events (e.g., medications, appointments).   g. Writing down important information (e.g., tasks, conversational details, list items) immediately, to strengthen encoding and for later reference. This will also help to free up cognitive resources to focus on the task at hand.   h. Associating new information with older, previously stored information (e.g., familiar categories, anecdotes, references, reminders).   i. Using recognition cue cards in everyday memory situations that come up with frequency, such as lists of routine household tasks, errands, or grocery items to prompt recall.   j. Having specific locations for frequently used items (e.g., keys, wallet, phone), especially for items most often needed when exiting home.       stress may intermittently interfere with cognitive effectiveness,      encouraged to maintain a socially active lifestyle, as social activities may offer cognitive stimulation, help to prevent feelings of depression, and provide emotional benefits that promote quality of  "life. You are likely to benefit from maximizing engagement in activities that you enjoy, as well as pursuing new meaningful hobbies.      Leading a physically, socially, and cognitively active lifestyle is important for healthy brain aging. Within the bounds of safety, good judgment, and medical advice, this includes engaging in regular physical activity (e.g., walking, swimming, yoga).   In general, the following activities and interventions are recommended for optimizing brain health and preserving cognitive function:   a. \"heart-healthy\"/cardiovascular diet;   b. good stress management (e.g., relaxation techniques);   c. management of any vascular risks (e.g., hypertension, cholesterol);   d. 30-60 minutes of daily aerobic exercise (e.g., walking, swimming);   e. social engagement (e.g., getting together with other people);    f. adequate sleep; and    g. cognitively stimulating activities (e.g., classes to learn new things, challenging puzzles).    Here are more resources available for you :    a. Consultation with a  who specializes in cognitive decline in older adulthood. They can assist with counseling, educational resources, patient support groups, caregiver support groups, and preparation for future changes. Beth Wachter or Bailee Ty at [facility] ([address] [place] [address] [address] in [place]; [phone]) could help with a referral, or they can contact the Alzheimer's Association 24-hour Helpline (1-274.828.7357).   b. Araseli Mandel at [facility] runs a caregiver training program that uses empirically based techniques to prepare and equip caregivers for the demands of that role when caring for someone with dementia.    c. Helpful resources for addressing the day-to-day challenges of cognitive dysfunction are offered at the Alzheimer's Association (now the Alzheimer's Disease and Related Disorders Association) website (www.alz.org) or by calling their " "24-hour Helpline (1-717.231.8361).    jean. The [employer] [place] website also has helpful information: http://[email or web address].org/caregiver/jsp/home.jsp   Please make a follow up appointment as needed.    For any urgent issues or needing to speak to a medical assistant please call [phone], option 6 during our office hours Monday-Friday 8am-4pm, and leave a voicemail with your concern.  My office will try to reach back you as soon as possible within 24 (business) hours.  If you have an emergency please call 911 or visit a local urgent care or nearest emergency room.      Please understand that [employer] is a useful communication tool for simple \"normal\" results or a refill request but I would not recommend using this tool for emergent or urgent issues or for conversations with me.  I am happy to ask my staff to rearrange a follow up visit or a virtual visit sooner than requested if appropriate for your care.     "

## 2024-03-08 ENCOUNTER — OFFICE VISIT (OUTPATIENT)
Dept: PODIATRY | Facility: CLINIC | Age: 79
End: 2024-03-08
Payer: MEDICARE

## 2024-03-08 DIAGNOSIS — M79.674 TOE PAIN, RIGHT: ICD-10-CM

## 2024-03-08 DIAGNOSIS — M79.672 LEFT FOOT PAIN: ICD-10-CM

## 2024-03-08 DIAGNOSIS — M79.675 TOE PAIN, LEFT: ICD-10-CM

## 2024-03-08 DIAGNOSIS — B35.1 TINEA UNGUIUM: ICD-10-CM

## 2024-03-08 DIAGNOSIS — L84 CORNS AND CALLOSITIES: Primary | ICD-10-CM

## 2024-03-08 PROCEDURE — 1126F AMNT PAIN NOTED NONE PRSNT: CPT | Performed by: PODIATRIST

## 2024-03-08 PROCEDURE — 1159F MED LIST DOCD IN RCRD: CPT | Performed by: PODIATRIST

## 2024-03-08 PROCEDURE — 1036F TOBACCO NON-USER: CPT | Performed by: PODIATRIST

## 2024-03-08 PROCEDURE — 1157F ADVNC CARE PLAN IN RCRD: CPT | Performed by: PODIATRIST

## 2024-03-08 PROCEDURE — 11721 DEBRIDE NAIL 6 OR MORE: CPT | Performed by: PODIATRIST

## 2024-03-08 PROCEDURE — 1160F RVW MEDS BY RX/DR IN RCRD: CPT | Performed by: PODIATRIST

## 2024-03-08 PROCEDURE — 11055 PARING/CUTG B9 HYPRKER LES 1: CPT | Performed by: PODIATRIST

## 2024-03-08 NOTE — PROGRESS NOTES
CC:  painful thickened and elongated toenails    HPI:  Pt presents complaining painful thickened and elongated toenails that are difficult to manage.  Onset was gradual with worsening course until recently.  Aggravated by shoe gear and ambulation. Callus left 3rd toe.      PCP: Dr. Armstrong  Last visit: 12-8-23     PMH  Past Medical History:   Diagnosis Date    Cataract     HL (hearing loss)     Osteoporosis     Stroke (CMS/HCC)     Visual impairment      MEDS    Current Outpatient Medications:     aspirin 81 mg EC tablet, Take 1 tablet (81 mg) by mouth once daily., Disp: , Rfl:     atorvastatin (Lipitor) 40 mg tablet, Take 1 tablet (40 mg) by mouth once daily. At bedtime, Disp: 90 tablet, Rfl: 1    b complex vitamins (Vitamins B Complex) capsule, Take 1 capsule by mouth once daily., Disp: , Rfl:     calcium carbonate 600 mg calcium (1,500 mg) tablet, Take 1 tablet (1,500 mg) by mouth once daily., Disp: , Rfl:     cholecalciferol (Vitamin D-3) 125 MCG (5000 UT) capsule, Take 1 capsule (125 mcg) by mouth once daily., Disp: , Rfl:     famotidine (Pepcid) 20 mg tablet, Take 1 tablet (20 mg) by mouth as needed at bedtime for indigestion., Disp: , Rfl:     metoprolol succinate XL (Toprol-XL) 25 mg 24 hr tablet, TAKE 1 TABLET DAILY (DO NOT CRUSH OR CHEW), Disp: 90 tablet, Rfl: 1  Allergies  Allergies   Allergen Reactions    Sulfa (Sulfonamide Antibiotics) Unknown     Social History     Socioeconomic History    Marital status:      Spouse name: None    Number of children: None    Years of education: None    Highest education level: None   Occupational History    None   Tobacco Use    Smoking status: Never     Passive exposure: Past    Smokeless tobacco: Never   Vaping Use    Vaping Use: Never used   Substance and Sexual Activity    Alcohol use: Yes     Alcohol/week: 2.0 standard drinks of alcohol     Types: 1 Cans of beer, 1 Standard drinks or equivalent per week     Comment: very rare    Drug use: Never    Sexual  activity: None   Other Topics Concern    None   Social History Narrative    None     Social Determinants of Health     Financial Resource Strain: Not on file   Food Insecurity: Not on file   Transportation Needs: Not on file   Physical Activity: Not on file   Stress: Not on file   Social Connections: Not on file   Intimate Partner Violence: Not on file   Housing Stability: Not on file     Family History   Problem Relation Name Age of Onset    Hypertension Mother      Hypertension Father      Colon cancer Mother's Brother      Stroke Maternal Grandmother       Past Surgical History:   Procedure Laterality Date    ADENOIDECTOMY       SECTION, LOW TRANSVERSE      EYE SURGERY      HERNIA REPAIR      TONSILLECTOMY      TUBAL LIGATION      WISDOM TOOTH EXTRACTION         REVIEW OF SYSTEMS    DERM:   + as noted in HPI.       Physical examination:   On General Observation: Patient is a pleasant, cooperative, well developed 78 y.o.  adult female. The patient is alert and oriented to time, place and person. Patient has normal affect and mood.  There were no vitals taken for this visit.    Vascular:  DP and PT pulses are 2/4 b/l.  mild edema noted. mild varicosities b/l.  CFT  6 seconds to all digits bilateral.  Skin temperature is warm to warm from proximal to distal bilateral.      Muscular: Strength is 5/5 for all instrinsic and extrinsic muscle groups.     Neuro:  Proprioception present.   Sensation to vibration is  present. Protective sensation intact  at all pedal sites via [place] 5.07 monofilament bilateral.  Light touch present bilateral.     Derm:    Decreased hair growth b/l le  Left toenails: 1-5 Brittleness, crumbling upon debridement, subungual debris, elongation, mycotic appearance, tenderness, and thickness.   Right toenails: 1-5 Brittleness, crumbling upon debridement, subungual debris, elongation, mycotic appearance, tenderness, and thickness.   Callus is present left 3rd toe lateral  pipj    ASSESSMENT:    Corn left 3rd toe  Pain left foot  Tinea Unguium [B35.1]   Pain in right toe(s) [M79.674]   Pain in left toe(s) [M79.675]       PLAN:     -debrided corn left 3rd toe with 15 blade  - Debrided toenails 1-10 in length and height.   - Follow up in 9-12 weeks.       Piter Xiao DPM

## 2024-05-10 ENCOUNTER — PROCEDURE VISIT (OUTPATIENT)
Dept: PODIATRY | Facility: CLINIC | Age: 79
End: 2024-05-10
Payer: MEDICARE

## 2024-05-10 VITALS — WEIGHT: 131 LBS | BODY MASS INDEX: 21.83 KG/M2 | HEIGHT: 65 IN

## 2024-05-10 DIAGNOSIS — M79.675 TOE PAIN, LEFT: ICD-10-CM

## 2024-05-10 DIAGNOSIS — L84 CORNS AND CALLOSITIES: ICD-10-CM

## 2024-05-10 DIAGNOSIS — B35.1 TINEA UNGUIUM: ICD-10-CM

## 2024-05-10 DIAGNOSIS — M79.674 TOE PAIN, RIGHT: Primary | ICD-10-CM

## 2024-05-10 PROCEDURE — 11721 DEBRIDE NAIL 6 OR MORE: CPT | Performed by: PODIATRIST

## 2024-05-10 NOTE — PROGRESS NOTES
CC:  painful thickened and elongated toenails     HPI:  Pt presents complaining painful thickened and elongated toenails that are difficult to manage.  Onset was gradual with worsening course until recently.  Aggravated by shoe gear and ambulation. Callus left 3rd toe.        PCP: Dr. Armstrong  Last visit: 12-8-23      PMH  Medical History        Past Medical History:   Diagnosis Date    Cataract      HL (hearing loss)      Osteoporosis      Stroke (CMS/HCC)      Visual impairment           MEDS     Current Outpatient Medications:     aspirin 81 mg EC tablet, Take 1 tablet (81 mg) by mouth once daily., Disp: , Rfl:     atorvastatin (Lipitor) 40 mg tablet, Take 1 tablet (40 mg) by mouth once daily. At bedtime, Disp: 90 tablet, Rfl: 1    b complex vitamins (Vitamins B Complex) capsule, Take 1 capsule by mouth once daily., Disp: , Rfl:     calcium carbonate 600 mg calcium (1,500 mg) tablet, Take 1 tablet (1,500 mg) by mouth once daily., Disp: , Rfl:     cholecalciferol (Vitamin D-3) 125 MCG (5000 UT) capsule, Take 1 capsule (125 mcg) by mouth once daily., Disp: , Rfl:     famotidine (Pepcid) 20 mg tablet, Take 1 tablet (20 mg) by mouth as needed at bedtime for indigestion., Disp: , Rfl:     metoprolol succinate XL (Toprol-XL) 25 mg 24 hr tablet, TAKE 1 TABLET DAILY (DO NOT CRUSH OR CHEW), Disp: 90 tablet, Rfl: 1  Allergies       Allergies   Allergen Reactions    Sulfa (Sulfonamide Antibiotics) Unknown      Social History   Social History            Socioeconomic History    Marital status:        Spouse name: None    Number of children: None    Years of education: None    Highest education level: None   Occupational History    None   Tobacco Use    Smoking status: Never       Passive exposure: Past    Smokeless tobacco: Never   Vaping Use    Vaping Use: Never used   Substance and Sexual Activity    Alcohol use: Yes       Alcohol/week: 2.0 standard drinks of alcohol       Types: 1 Cans of beer, 1 Standard drinks  or equivalent per week       Comment: very rare    Drug use: Never    Sexual activity: None   Other Topics Concern    None   Social History Narrative    None      Social Determinants of Health      Financial Resource Strain: Not on file   Food Insecurity: Not on file   Transportation Needs: Not on file   Physical Activity: Not on file   Stress: Not on file   Social Connections: Not on file   Intimate Partner Violence: Not on file   Housing Stability: Not on file         Family History          Family History   Problem Relation Name Age of Onset    Hypertension Mother        Hypertension Father        Colon cancer Mother's Brother        Stroke Maternal Grandmother             Surgical History         Past Surgical History:   Procedure Laterality Date    ADENOIDECTOMY         SECTION, LOW TRANSVERSE        EYE SURGERY        HERNIA REPAIR        TONSILLECTOMY        TUBAL LIGATION        WISDOM TOOTH EXTRACTION                REVIEW OF SYSTEMS     DERM:   + as noted in HPI.         Physical examination:   On General Observation: Patient is a pleasant, cooperative, well developed 78 y.o.  adult female. The patient is alert and oriented to time, place and person. Patient has normal affect and mood.  There were no vitals taken for this visit.     Vascular:  DP and PT pulses are 2/4 b/l.  mild edema noted. mild varicosities b/l.  CFT  6 seconds to all digits bilateral.  Skin temperature is warm to warm from proximal to distal bilateral.       Muscular: Strength is 5/5 for all instrinsic and extrinsic muscle groups.      Neuro:  Proprioception present.   Sensation to vibration is  present. Protective sensation intact  at all pedal sites via [place] Ricky 5.07 monofilament bilateral.  Light touch present bilateral.      Derm:    Decreased hair growth b/l le  Left toenails: 1-5 Brittleness, crumbling upon debridement, subungual debris, elongation, mycotic appearance, tenderness, and thickness.   Right toenails:  1-5 Brittleness, crumbling upon debridement, subungual debris, elongation, mycotic appearance, tenderness, and thickness.        ASSESSMENT:        Tinea Unguium [B35.1]   Pain in right toe(s) [M79.674]   Pain in left toe(s) [M79.675]         PLAN:        - Debrided toenails 1-10 in length and height.   - Follow up in 9-12 weeks.         Piter Xiao DPM

## 2024-05-14 ENCOUNTER — OFFICE VISIT (OUTPATIENT)
Dept: PRIMARY CARE | Facility: CLINIC | Age: 79
End: 2024-05-14
Payer: MEDICARE

## 2024-05-14 VITALS
WEIGHT: 132 LBS | BODY MASS INDEX: 21.97 KG/M2 | TEMPERATURE: 98.1 F | SYSTOLIC BLOOD PRESSURE: 148 MMHG | DIASTOLIC BLOOD PRESSURE: 70 MMHG | HEART RATE: 67 BPM | OXYGEN SATURATION: 97 %

## 2024-05-14 DIAGNOSIS — I10 HTN (HYPERTENSION), BENIGN: Primary | ICD-10-CM

## 2024-05-14 DIAGNOSIS — R73.03 PREDIABETES: ICD-10-CM

## 2024-05-14 DIAGNOSIS — E78.5 HYPERLIPIDEMIA, UNSPECIFIED HYPERLIPIDEMIA TYPE: ICD-10-CM

## 2024-05-14 DIAGNOSIS — M70.62 TROCHANTERIC BURSITIS OF BOTH HIPS: ICD-10-CM

## 2024-05-14 DIAGNOSIS — E78.2 MIXED HYPERLIPIDEMIA: ICD-10-CM

## 2024-05-14 DIAGNOSIS — M70.61 TROCHANTERIC BURSITIS OF BOTH HIPS: ICD-10-CM

## 2024-05-14 PROCEDURE — 1160F RVW MEDS BY RX/DR IN RCRD: CPT | Performed by: STUDENT IN AN ORGANIZED HEALTH CARE EDUCATION/TRAINING PROGRAM

## 2024-05-14 PROCEDURE — 1036F TOBACCO NON-USER: CPT | Performed by: STUDENT IN AN ORGANIZED HEALTH CARE EDUCATION/TRAINING PROGRAM

## 2024-05-14 PROCEDURE — 1159F MED LIST DOCD IN RCRD: CPT | Performed by: STUDENT IN AN ORGANIZED HEALTH CARE EDUCATION/TRAINING PROGRAM

## 2024-05-14 PROCEDURE — 99214 OFFICE O/P EST MOD 30 MIN: CPT | Performed by: STUDENT IN AN ORGANIZED HEALTH CARE EDUCATION/TRAINING PROGRAM

## 2024-05-14 PROCEDURE — 3077F SYST BP >= 140 MM HG: CPT | Performed by: STUDENT IN AN ORGANIZED HEALTH CARE EDUCATION/TRAINING PROGRAM

## 2024-05-14 PROCEDURE — 1157F ADVNC CARE PLAN IN RCRD: CPT | Performed by: STUDENT IN AN ORGANIZED HEALTH CARE EDUCATION/TRAINING PROGRAM

## 2024-05-14 PROCEDURE — 3078F DIAST BP <80 MM HG: CPT | Performed by: STUDENT IN AN ORGANIZED HEALTH CARE EDUCATION/TRAINING PROGRAM

## 2024-05-14 RX ORDER — METOPROLOL SUCCINATE 25 MG/1
TABLET, EXTENDED RELEASE ORAL
Qty: 90 TABLET | Refills: 1 | Status: SHIPPED | OUTPATIENT
Start: 2024-05-14

## 2024-05-14 RX ORDER — ATORVASTATIN CALCIUM 40 MG/1
40 TABLET, FILM COATED ORAL DAILY
Qty: 90 TABLET | Refills: 1 | Status: SHIPPED | OUTPATIENT
Start: 2024-05-14 | End: 2024-11-10

## 2024-05-14 ASSESSMENT — ENCOUNTER SYMPTOMS
ABDOMINAL PAIN: 0
FATIGUE: 0
DYSURIA: 0
HEMATURIA: 0
HEADACHES: 0
DIZZINESS: 0
DEPRESSION: 0
HYPERTENSION: 1
FREQUENCY: 0
NUMBNESS: 0
CHILLS: 0
DYSPHORIC MOOD: 0
LOSS OF SENSATION IN FEET: 0
OCCASIONAL FEELINGS OF UNSTEADINESS: 1
PALPITATIONS: 0
CONSTIPATION: 0
WHEEZING: 0
COUGH: 0
FEVER: 0
DIARRHEA: 0
NAUSEA: 0
NERVOUS/ANXIOUS: 0
SHORTNESS OF BREATH: 0

## 2024-05-14 ASSESSMENT — PATIENT HEALTH QUESTIONNAIRE - PHQ9
1. LITTLE INTEREST OR PLEASURE IN DOING THINGS: NOT AT ALL
SUM OF ALL RESPONSES TO PHQ9 QUESTIONS 1 AND 2: 0
2. FEELING DOWN, DEPRESSED OR HOPELESS: NOT AT ALL

## 2024-05-14 NOTE — PROGRESS NOTES
Subjective   Patient ID: Deena Pop is a 78 y.o. female who presents for Hypertension (Follow up).    Hypertension  Pertinent negatives include no chest pain, headaches, palpitations or shortness of breath.        Follow-up of  hypertension. Current medications Beta-Blocker started after her stroke. Previous medications none. Home blood pressure readings:  hard time putting it on . Salt intake and diet: Caffeine: none, Salt added to food: yes, rare, Salt added to cooking: eats a lot of frozen meals, grocery prepared foods, Salty foods:processed foods, salty snacks, Fast food: occasional   Associated signs and symptoms: none. Patient denies: blurred vision, chest pain, dyspnea, headache, and palpitations. Use of agents associated with hypertension: none. Medication compliance: taking as prescribed.     Her left hip has been bothering her a lot x several months. She does [employer] and when she rotates on it she gets a sharp stabbing pain. She has problems lying on her side at night. Pain is on the side and back. No pain when at rest. No pain when walking. Cannot take NSAIDs but does take tylenol.     Review of Systems   Constitutional:  Negative for chills, fatigue and fever.   Respiratory:  Negative for cough, shortness of breath and wheezing.    Cardiovascular:  Negative for chest pain, palpitations and leg swelling.   Gastrointestinal:  Negative for abdominal pain, constipation, diarrhea and nausea.   Genitourinary:  Negative for dysuria, frequency, hematuria and urgency.   Neurological:  Negative for dizziness, numbness and headaches.   Psychiatric/Behavioral:  Negative for dysphoric mood. The patient is not nervous/anxious.        Objective   /70 (BP Location: Left arm, Patient Position: Sitting, BP Cuff Size: Adult)   Pulse 67   Temp 36.7 °C (98.1 °F) (Temporal)   Wt 59.9 kg (132 lb)   SpO2 97%   BMI 21.97 kg/m²     Physical Exam  Constitutional:       Appearance: Normal appearance.   HENT:       Head: Normocephalic and atraumatic.   Eyes:      Extraocular Movements: Extraocular movements intact.      Pupils: Pupils are equal, round, and reactive to light.   Cardiovascular:      Rate and Rhythm: Normal rate and regular rhythm.      Heart sounds: Normal heart sounds. No murmur heard.  Pulmonary:      Effort: Pulmonary effort is normal.      Breath sounds: Normal breath sounds. No wheezing.   Abdominal:      General: Bowel sounds are normal.      Palpations: Abdomen is soft.      Tenderness: There is no abdominal tenderness. There is no guarding.   Skin:     General: Skin is warm and dry.   Neurological:      General: No focal deficit present.      Mental Status: She is alert and oriented to person, place, and time.   Psychiatric:         Mood and Affect: Mood normal.         Behavior: Behavior normal.   Right Hip Exam     Tenderness   The patient is experiencing tenderness in the greater trochanter.    Range of Motion   Abduction:  abnormal   Adduction:  abnormal   Flexion:  abnormal   External rotation:  abnormal   Internal rotation:  abnormal     Muscle Strength   Abduction: 4/5   Adduction: 4/5   Flexion: 4/5     Tests   ZACARIAS: positive      Left Hip Exam     Tenderness   The patient is experiencing tenderness in the greater trochanter.    Range of Motion   Abduction:  abnormal   Adduction:  abnormal   Flexion:  abnormal   External rotation:  abnormal   Internal rotation: abnormal     Muscle Strength   Abduction: 4/5   Adduction: 4/5   Flexion: 4/5     Tests   ZACARIAS: positive            Assessment/Plan   Problem List Items Addressed This Visit       Hyperlipidemia    Relevant Medications    atorvastatin (Lipitor) 40 mg tablet    Other Relevant Orders    Lipid Panel    Comprehensive Metabolic Panel    CBC    HTN (hypertension), benign - Primary    Relevant Medications    metoprolol succinate XL (Toprol-XL) 25 mg 24 hr tablet    Other Relevant Orders    Lipid Panel    Comprehensive Metabolic Panel    CBC      Other Visit Diagnoses       Trochanteric bursitis of both hips        Relevant Orders    Referral to Physical Therapy    Prediabetes        Relevant Orders    Hemoglobin A1C          Will continue current medications  Referral to physical therapy  Will follow-up in 6 months for a Medicare physical with fasting blood work prior       Patient understands and agrees with treatment plan    Radha Armstrong, DO   05/14/24

## 2024-05-14 NOTE — PATIENT INSTRUCTIONS
Will continue current medications  Referral for physical therapy  Follow-up in 6 months for Medicare physical with fasting blood work prior

## 2024-08-16 ENCOUNTER — APPOINTMENT (OUTPATIENT)
Dept: PODIATRY | Facility: CLINIC | Age: 79
End: 2024-08-16
Payer: MEDICARE

## 2024-08-16 DIAGNOSIS — M21.622 TAILOR'S BUNION OF LEFT FOOT: Primary | ICD-10-CM

## 2024-08-16 DIAGNOSIS — B35.1 TINEA UNGUIUM: ICD-10-CM

## 2024-08-16 DIAGNOSIS — M79.675 TOE PAIN, LEFT: ICD-10-CM

## 2024-08-16 DIAGNOSIS — M79.674 TOE PAIN, RIGHT: ICD-10-CM

## 2024-08-16 PROCEDURE — 99212 OFFICE O/P EST SF 10 MIN: CPT | Performed by: PODIATRIST

## 2024-08-16 PROCEDURE — 11721 DEBRIDE NAIL 6 OR MORE: CPT | Performed by: PODIATRIST

## 2024-08-16 PROCEDURE — 1157F ADVNC CARE PLAN IN RCRD: CPT | Performed by: PODIATRIST

## 2024-08-16 PROCEDURE — 1159F MED LIST DOCD IN RCRD: CPT | Performed by: PODIATRIST

## 2024-08-16 NOTE — PROGRESS NOTES
CC:  painful thickened and elongated toenails     HPI:  Pt presents complaining painful thickened and elongated toenails that are difficult to manage.  Onset was gradual with worsening course until recently.  Aggravated by shoe gear and ambulation. Also pain bottom of the left foot.        PCP: Dr. Armstrong  Last visit: 5-14-24      PMH  Medical History           Past Medical History:   Diagnosis Date    Cataract      HL (hearing loss)      Osteoporosis      Stroke (CMS/HCC)      Visual impairment           MEDS     Current Outpatient Medications:     aspirin 81 mg EC tablet, Take 1 tablet (81 mg) by mouth once daily., Disp: , Rfl:     atorvastatin (Lipitor) 40 mg tablet, Take 1 tablet (40 mg) by mouth once daily. At bedtime, Disp: 90 tablet, Rfl: 1    b complex vitamins (Vitamins B Complex) capsule, Take 1 capsule by mouth once daily., Disp: , Rfl:     calcium carbonate 600 mg calcium (1,500 mg) tablet, Take 1 tablet (1,500 mg) by mouth once daily., Disp: , Rfl:     cholecalciferol (Vitamin D-3) 125 MCG (5000 UT) capsule, Take 1 capsule (125 mcg) by mouth once daily., Disp: , Rfl:     famotidine (Pepcid) 20 mg tablet, Take 1 tablet (20 mg) by mouth as needed at bedtime for indigestion., Disp: , Rfl:     metoprolol succinate XL (Toprol-XL) 25 mg 24 hr tablet, TAKE 1 TABLET DAILY (DO NOT CRUSH OR CHEW), Disp: 90 tablet, Rfl: 1  Allergies          Allergies   Allergen Reactions    Sulfa (Sulfonamide Antibiotics) Unknown      Social History   Social History                Socioeconomic History    Marital status:        Spouse name: None    Number of children: None    Years of education: None    Highest education level: None   Occupational History    None   Tobacco Use    Smoking status: Never       Passive exposure: Past    Smokeless tobacco: Never   Vaping Use    Vaping Use: Never used   Substance and Sexual Activity    Alcohol use: Yes       Alcohol/week: 2.0 standard drinks of alcohol       Types: 1 Cans of  beer, 1 Standard drinks or equivalent per week       Comment: very rare    Drug use: Never    Sexual activity: None   Other Topics Concern    None   Social History Narrative    None      Social Determinants of Health      Financial Resource Strain: Not on file   Food Insecurity: Not on file   Transportation Needs: Not on file   Physical Activity: Not on file   Stress: Not on file   Social Connections: Not on file   Intimate Partner Violence: Not on file   Housing Stability: Not on file         Family History               Family History   Problem Relation Name Age of Onset    Hypertension Mother        Hypertension Father        Colon cancer Mother's Brother        Stroke Maternal Grandmother             Surgical History             Past Surgical History:   Procedure Laterality Date    ADENOIDECTOMY         SECTION, LOW TRANSVERSE        EYE SURGERY        HERNIA REPAIR        TONSILLECTOMY        TUBAL LIGATION        WISDOM TOOTH EXTRACTION                REVIEW OF SYSTEMS     DERM:   + as noted in HPI.         Physical examination:   On General Observation: Patient is a pleasant, cooperative, well developed 78 y.o.  adult female. The patient is alert and oriented to time, place and person. Patient has normal affect and mood.  There were no vitals taken for this visit.     Vascular:  DP and PT pulses are 2/4 b/l.  mild edema noted. mild varicosities b/l.  CFT  6 seconds to all digits bilateral.  Skin temperature is warm to warm from proximal to distal bilateral.       Muscular: Strength is 5/5 for all instrinsic and extrinsic muscle groups.      Neuro:  Proprioception present.   Sensation to vibration is  present. Protective sensation intact  at all pedal sites via [place] Ricky 5.07 monofilament bilateral.  Light touch present bilateral.      Derm:    Decreased hair growth b/l le  Left toenails: 1-5 Brittleness, crumbling upon debridement, subungual debris, elongation, mycotic appearance, tenderness,  and thickness.   Right toenails: 1-5 Brittleness, crumbling upon debridement, subungual debris, elongation, mycotic appearance, tenderness, and thickness.      Ortho:  Prominence is present lateral 5th metatarsal head, loss of the plantar fat pad.    ASSESSMENT:       Tailors bunion left  Tinea Unguium [B35.1]   Pain in right toe(s) [M79.674]   Pain in left toe(s) [M79.675]         PLAN:       Exam  -Reviewed etiology of bunion, recommend wider and deeper shoes, will address the plantar pain with next pair of orthotics.  - Debrided toenails 1-10 in length and height.   - Follow up in 9-12 weeks.         Piter Xiao DPM

## 2024-10-18 ENCOUNTER — APPOINTMENT (OUTPATIENT)
Dept: PODIATRY | Facility: CLINIC | Age: 79
End: 2024-10-18
Payer: MEDICARE

## 2024-10-18 DIAGNOSIS — M79.674 TOE PAIN, RIGHT: ICD-10-CM

## 2024-10-18 DIAGNOSIS — B35.1 TINEA UNGUIUM: Primary | ICD-10-CM

## 2024-10-18 DIAGNOSIS — M79.675 TOE PAIN, LEFT: ICD-10-CM

## 2024-10-18 PROCEDURE — 11721 DEBRIDE NAIL 6 OR MORE: CPT | Performed by: PODIATRIST

## 2024-10-18 NOTE — PROGRESS NOTES
CC:  painful thickened and elongated toenails, orthotics.     HPI:  Pt presents complaining painful thickened and elongated toenails that are difficult to manage.  Onset was gradual with worsening course until recently.  Aggravated by shoe gear and ambulation. Also pain bottom of the left foot. Needs new orthotics.        PCP: Dr. Armstrong  Last visit: 5-14-24      PMH  Medical History           Past Medical History:   Diagnosis Date    Cataract      HL (hearing loss)      Osteoporosis      Stroke (CMS/HCC)      Visual impairment           MEDS     Current Outpatient Medications:     aspirin 81 mg EC tablet, Take 1 tablet (81 mg) by mouth once daily., Disp: , Rfl:     atorvastatin (Lipitor) 40 mg tablet, Take 1 tablet (40 mg) by mouth once daily. At bedtime, Disp: 90 tablet, Rfl: 1    b complex vitamins (Vitamins B Complex) capsule, Take 1 capsule by mouth once daily., Disp: , Rfl:     calcium carbonate 600 mg calcium (1,500 mg) tablet, Take 1 tablet (1,500 mg) by mouth once daily., Disp: , Rfl:     cholecalciferol (Vitamin D-3) 125 MCG (5000 UT) capsule, Take 1 capsule (125 mcg) by mouth once daily., Disp: , Rfl:     famotidine (Pepcid) 20 mg tablet, Take 1 tablet (20 mg) by mouth as needed at bedtime for indigestion., Disp: , Rfl:     metoprolol succinate XL (Toprol-XL) 25 mg 24 hr tablet, TAKE 1 TABLET DAILY (DO NOT CRUSH OR CHEW), Disp: 90 tablet, Rfl: 1  Allergies          Allergies   Allergen Reactions    Sulfa (Sulfonamide Antibiotics) Unknown      Social History   Social History                Socioeconomic History    Marital status:        Spouse name: None    Number of children: None    Years of education: None    Highest education level: None   Occupational History    None   Tobacco Use    Smoking status: Never       Passive exposure: Past    Smokeless tobacco: Never   Vaping Use    Vaping Use: Never used   Substance and Sexual Activity    Alcohol use: Yes       Alcohol/week: 2.0 standard drinks  of alcohol       Types: 1 Cans of beer, 1 Standard drinks or equivalent per week       Comment: very rare    Drug use: Never    Sexual activity: None   Other Topics Concern    None   Social History Narrative    None      Social Determinants of Health      Financial Resource Strain: Not on file   Food Insecurity: Not on file   Transportation Needs: Not on file   Physical Activity: Not on file   Stress: Not on file   Social Connections: Not on file   Intimate Partner Violence: Not on file   Housing Stability: Not on file         Family History               Family History   Problem Relation Name Age of Onset    Hypertension Mother        Hypertension Father        Colon cancer Mother's Brother        Stroke Maternal Grandmother             Surgical History             Past Surgical History:   Procedure Laterality Date    ADENOIDECTOMY         SECTION, LOW TRANSVERSE        EYE SURGERY        HERNIA REPAIR        TONSILLECTOMY        TUBAL LIGATION        WISDOM TOOTH EXTRACTION                REVIEW OF SYSTEMS     DERM:   + as noted in HPI.         Physical examination:   On General Observation: Patient is a pleasant, cooperative, well developed 78 y.o.  adult female. The patient is alert and oriented to time, place and person. Patient has normal affect and mood.  There were no vitals taken for this visit.     Vascular:  DP and PT pulses are 2/4 b/l.  mild edema noted. mild varicosities b/l.  CFT  6 seconds to all digits bilateral.  Skin temperature is warm to warm from proximal to distal bilateral.       Muscular: Strength is 5/5 for all instrinsic and extrinsic muscle groups.      Neuro:  Proprioception present.   Sensation to vibration is  present. Protective sensation intact  at all pedal sites via [place] Ricky 5.07 monofilament bilateral.  Light touch present bilateral.      Derm:    Decreased hair growth b/l le  Left toenails: 1-5 Brittleness, crumbling upon debridement, subungual debris, elongation,  mycotic appearance, tenderness, and thickness.   Right toenails: 1-5 Brittleness, crumbling upon debridement, subungual debris, elongation, mycotic appearance, tenderness, and thickness.      Ortho:  Prominence is present lateral 5th metatarsal head, loss of the plantar fat pad.     ASSESSMENT:       Tailors bunion left  Tinea Unguium [B35.1]   Pain in right toe(s) [M79.674]   Pain in left toe(s) [M79.675]         PLAN:      -Reviewed etiology of bunion, recommend wider and deeper shoes, casted for new orthotics biofoam sport with aperture pads sub 5th metatarsal heads, abn signed.  - Debrided toenails 1-10 in length and height.   - Follow up in 9-12 weeks.         Piter Xiao DPM

## 2024-11-04 ENCOUNTER — LAB (OUTPATIENT)
Dept: LAB | Facility: LAB | Age: 79
End: 2024-11-04
Payer: MEDICARE

## 2024-11-04 DIAGNOSIS — E78.2 MIXED HYPERLIPIDEMIA: ICD-10-CM

## 2024-11-04 DIAGNOSIS — I10 HTN (HYPERTENSION), BENIGN: ICD-10-CM

## 2024-11-04 DIAGNOSIS — R73.03 PREDIABETES: ICD-10-CM

## 2024-11-04 LAB
ALBUMIN SERPL BCP-MCNC: 4.1 G/DL (ref 3.4–5)
ALP SERPL-CCNC: 57 U/L (ref 33–136)
ALT SERPL W P-5'-P-CCNC: 27 U/L (ref 7–45)
ANION GAP SERPL CALC-SCNC: 10 MMOL/L (ref 10–20)
AST SERPL W P-5'-P-CCNC: 23 U/L (ref 9–39)
BILIRUB SERPL-MCNC: 0.6 MG/DL (ref 0–1.2)
BUN SERPL-MCNC: 16 MG/DL (ref 6–23)
CALCIUM SERPL-MCNC: 9 MG/DL (ref 8.6–10.3)
CHLORIDE SERPL-SCNC: 103 MMOL/L (ref 98–107)
CHOLEST SERPL-MCNC: 131 MG/DL (ref 0–199)
CHOLESTEROL/HDL RATIO: 2
CO2 SERPL-SCNC: 31 MMOL/L (ref 21–32)
CREAT SERPL-MCNC: 0.75 MG/DL (ref 0.5–1.05)
EGFRCR SERPLBLD CKD-EPI 2021: 81 ML/MIN/1.73M*2
ERYTHROCYTE [DISTWIDTH] IN BLOOD BY AUTOMATED COUNT: 12.3 % (ref 11.5–14.5)
EST. AVERAGE GLUCOSE BLD GHB EST-MCNC: 117 MG/DL
GLUCOSE SERPL-MCNC: 103 MG/DL (ref 74–99)
HBA1C MFR BLD: 5.7 %
HCT VFR BLD AUTO: 46.6 % (ref 36–46)
HDLC SERPL-MCNC: 64.5 MG/DL
HGB BLD-MCNC: 15.5 G/DL (ref 12–16)
LDLC SERPL CALC-MCNC: 49 MG/DL
MCH RBC QN AUTO: 32.6 PG (ref 26–34)
MCHC RBC AUTO-ENTMCNC: 33.3 G/DL (ref 32–36)
MCV RBC AUTO: 98 FL (ref 80–100)
NON HDL CHOLESTEROL: 67 MG/DL (ref 0–149)
NRBC BLD-RTO: 0 /100 WBCS (ref 0–0)
PLATELET # BLD AUTO: 247 X10*3/UL (ref 150–450)
POTASSIUM SERPL-SCNC: 3.8 MMOL/L (ref 3.5–5.3)
PROT SERPL-MCNC: 6.8 G/DL (ref 6.4–8.2)
RBC # BLD AUTO: 4.75 X10*6/UL (ref 4–5.2)
SODIUM SERPL-SCNC: 140 MMOL/L (ref 136–145)
TRIGL SERPL-MCNC: 86 MG/DL (ref 0–149)
VLDL: 17 MG/DL (ref 0–40)
WBC # BLD AUTO: 4.1 X10*3/UL (ref 4.4–11.3)

## 2024-11-04 PROCEDURE — 80053 COMPREHEN METABOLIC PANEL: CPT

## 2024-11-04 PROCEDURE — 85027 COMPLETE CBC AUTOMATED: CPT

## 2024-11-04 PROCEDURE — 83036 HEMOGLOBIN GLYCOSYLATED A1C: CPT

## 2024-11-04 PROCEDURE — 36415 COLL VENOUS BLD VENIPUNCTURE: CPT

## 2024-11-04 PROCEDURE — 80061 LIPID PANEL: CPT

## 2024-11-11 ENCOUNTER — APPOINTMENT (OUTPATIENT)
Dept: PRIMARY CARE | Facility: CLINIC | Age: 79
End: 2024-11-11
Payer: MEDICARE

## 2024-11-11 VITALS
OXYGEN SATURATION: 94 % | DIASTOLIC BLOOD PRESSURE: 84 MMHG | TEMPERATURE: 97.2 F | SYSTOLIC BLOOD PRESSURE: 130 MMHG | HEIGHT: 65 IN | BODY MASS INDEX: 21.73 KG/M2 | WEIGHT: 130.4 LBS | HEART RATE: 68 BPM

## 2024-11-11 DIAGNOSIS — I10 HTN (HYPERTENSION), BENIGN: ICD-10-CM

## 2024-11-11 DIAGNOSIS — Z00.00 HEALTH MAINTENANCE EXAMINATION: ICD-10-CM

## 2024-11-11 DIAGNOSIS — I47.10 SUPRAVENTRICULAR TACHYCARDIA (CMS-HCC): ICD-10-CM

## 2024-11-11 DIAGNOSIS — N39.41 URGE INCONTINENCE: ICD-10-CM

## 2024-11-11 DIAGNOSIS — Z23 ENCOUNTER FOR IMMUNIZATION: ICD-10-CM

## 2024-11-11 DIAGNOSIS — Z78.0 ASYMPTOMATIC POSTMENOPAUSAL STATE: ICD-10-CM

## 2024-11-11 DIAGNOSIS — Z00.00 MEDICARE ANNUAL WELLNESS VISIT, SUBSEQUENT: Primary | ICD-10-CM

## 2024-11-11 DIAGNOSIS — Z13.31 DEPRESSION SCREENING: ICD-10-CM

## 2024-11-11 DIAGNOSIS — E78.5 HYPERLIPIDEMIA, UNSPECIFIED HYPERLIPIDEMIA TYPE: ICD-10-CM

## 2024-11-11 DIAGNOSIS — H35.3211 EXUDATIVE AGE-RELATED MACULAR DEGENERATION, RIGHT EYE, WITH ACTIVE CHOROIDAL NEOVASCULARIZATION: ICD-10-CM

## 2024-11-11 PROCEDURE — 1170F FXNL STATUS ASSESSED: CPT | Performed by: STUDENT IN AN ORGANIZED HEALTH CARE EDUCATION/TRAINING PROGRAM

## 2024-11-11 PROCEDURE — 1158F ADVNC CARE PLAN TLK DOCD: CPT | Performed by: STUDENT IN AN ORGANIZED HEALTH CARE EDUCATION/TRAINING PROGRAM

## 2024-11-11 PROCEDURE — G0444 DEPRESSION SCREEN ANNUAL: HCPCS | Performed by: STUDENT IN AN ORGANIZED HEALTH CARE EDUCATION/TRAINING PROGRAM

## 2024-11-11 PROCEDURE — 90677 PCV20 VACCINE IM: CPT | Performed by: STUDENT IN AN ORGANIZED HEALTH CARE EDUCATION/TRAINING PROGRAM

## 2024-11-11 PROCEDURE — 99214 OFFICE O/P EST MOD 30 MIN: CPT | Performed by: STUDENT IN AN ORGANIZED HEALTH CARE EDUCATION/TRAINING PROGRAM

## 2024-11-11 PROCEDURE — G0439 PPPS, SUBSEQ VISIT: HCPCS | Performed by: STUDENT IN AN ORGANIZED HEALTH CARE EDUCATION/TRAINING PROGRAM

## 2024-11-11 PROCEDURE — 3079F DIAST BP 80-89 MM HG: CPT | Performed by: STUDENT IN AN ORGANIZED HEALTH CARE EDUCATION/TRAINING PROGRAM

## 2024-11-11 PROCEDURE — 1159F MED LIST DOCD IN RCRD: CPT | Performed by: STUDENT IN AN ORGANIZED HEALTH CARE EDUCATION/TRAINING PROGRAM

## 2024-11-11 PROCEDURE — 1157F ADVNC CARE PLAN IN RCRD: CPT | Performed by: STUDENT IN AN ORGANIZED HEALTH CARE EDUCATION/TRAINING PROGRAM

## 2024-11-11 PROCEDURE — 1160F RVW MEDS BY RX/DR IN RCRD: CPT | Performed by: STUDENT IN AN ORGANIZED HEALTH CARE EDUCATION/TRAINING PROGRAM

## 2024-11-11 PROCEDURE — 1036F TOBACCO NON-USER: CPT | Performed by: STUDENT IN AN ORGANIZED HEALTH CARE EDUCATION/TRAINING PROGRAM

## 2024-11-11 PROCEDURE — G0009 ADMIN PNEUMOCOCCAL VACCINE: HCPCS | Performed by: STUDENT IN AN ORGANIZED HEALTH CARE EDUCATION/TRAINING PROGRAM

## 2024-11-11 PROCEDURE — 3075F SYST BP GE 130 - 139MM HG: CPT | Performed by: STUDENT IN AN ORGANIZED HEALTH CARE EDUCATION/TRAINING PROGRAM

## 2024-11-11 PROCEDURE — 1123F ACP DISCUSS/DSCN MKR DOCD: CPT | Performed by: STUDENT IN AN ORGANIZED HEALTH CARE EDUCATION/TRAINING PROGRAM

## 2024-11-11 PROCEDURE — 99397 PER PM REEVAL EST PAT 65+ YR: CPT | Performed by: STUDENT IN AN ORGANIZED HEALTH CARE EDUCATION/TRAINING PROGRAM

## 2024-11-11 RX ORDER — ATORVASTATIN CALCIUM 40 MG/1
40 TABLET, FILM COATED ORAL NIGHTLY
Qty: 90 TABLET | Refills: 3 | OUTPATIENT
Start: 2024-11-11

## 2024-11-11 RX ORDER — METOPROLOL SUCCINATE 25 MG/1
TABLET, EXTENDED RELEASE ORAL
Qty: 90 TABLET | Refills: 1 | Status: SHIPPED | OUTPATIENT
Start: 2024-11-11

## 2024-11-11 RX ORDER — DIPHENHYDRAMINE HCL 25 MG
25 CAPSULE ORAL NIGHTLY PRN
COMMUNITY

## 2024-11-11 RX ORDER — ATORVASTATIN CALCIUM 40 MG/1
40 TABLET, FILM COATED ORAL DAILY
Qty: 90 TABLET | Refills: 3 | Status: SHIPPED | OUTPATIENT
Start: 2024-11-11

## 2024-11-11 ASSESSMENT — ACTIVITIES OF DAILY LIVING (ADL)
MANAGING_FINANCES: INDEPENDENT
GROCERY_SHOPPING: INDEPENDENT
BATHING: INDEPENDENT
DRESSING: INDEPENDENT
TAKING_MEDICATION: INDEPENDENT
DOING_HOUSEWORK: INDEPENDENT

## 2024-11-11 ASSESSMENT — ENCOUNTER SYMPTOMS
DIZZINESS: 0
NERVOUS/ANXIOUS: 0
CONSTIPATION: 0
HEADACHES: 0
ABDOMINAL PAIN: 0
OCCASIONAL FEELINGS OF UNSTEADINESS: 0
HEMATURIA: 0
FEVER: 0
FREQUENCY: 0
NAUSEA: 0
SHORTNESS OF BREATH: 0
FATIGUE: 0
COUGH: 0
CHILLS: 0
DYSURIA: 0
WHEEZING: 0
NUMBNESS: 0
PALPITATIONS: 0
DYSPHORIC MOOD: 0
DIARRHEA: 0
DEPRESSION: 0

## 2024-11-11 ASSESSMENT — PATIENT HEALTH QUESTIONNAIRE - PHQ9
3. TROUBLE FALLING OR STAYING ASLEEP OR SLEEPING TOO MUCH: SEVERAL DAYS
2. FEELING DOWN, DEPRESSED OR HOPELESS: SEVERAL DAYS
SUM OF ALL RESPONSES TO PHQ9 QUESTIONS 1 AND 2: 1
1. LITTLE INTEREST OR PLEASURE IN DOING THINGS: NOT AT ALL
10. IF YOU CHECKED OFF ANY PROBLEMS, HOW DIFFICULT HAVE THESE PROBLEMS MADE IT FOR YOU TO DO YOUR WORK, TAKE CARE OF THINGS AT HOME, OR GET ALONG WITH OTHER PEOPLE: SOMEWHAT DIFFICULT

## 2024-11-11 NOTE — PROGRESS NOTES
Subjective   Reason for Visit: Deena Pop is an 78 y.o. female here for a Medicare Wellness visit.     Past Medical, Surgical, and Family History reviewed and updated in chart.    Reviewed all medications by prescribing practitioner or clinical pharmacist (such as prescriptions, OTCs, herbal therapies and supplements) and documented in the medical record.    HPI  Specialists seen by patient: Podiatry: Dr Xiao  -ENT: [facility]  -ophthalmology  -dentist  -derm    Last pap/cervical cancer screening: n/a  Last mammogram: approximate date  and was normal  Hx of colon ca screening:  n/a  Hx of DXA: 2020, will order  History of depression or anxiety:no  Immunizations: Shingrix, will get.  Prevnar 20, will get today  Diet: Follows a healthy diet  Exercise: Gets regular exercise, [employer], walks   Alcohol abuse screen:   Maybe 1 drink per week  How many times in the past year 4 or more drinks in a day? 0  Lung cancer screening:   Smoking history: never a smoker  Drug use: No  PHQ-2: 1  HCPOA: Yes, reviewed    Patient currently on 25 mg of metoprolol. Typically in the 120s/60s when she checks it. Sometimes 140s systolic, not often. Always resting.     She is having problems with urge incontinence. She wakes up at least once a night to urinate. Has been going on for years. Had 2 children, one  one vaginal. Used to have stress incontinence. She does leak. Would like to think about it.     Patient Self Assessment of Health Status  Patient Self Assessment: Good  Functional Ability/Level of Safety  Cognitive Impairment Observed: No cognitive impairment observed  [place] Home Safety Risk Factors  Home Safety Risk Factors: None  Nutrition and Exercise  Current Diet: Well Balanced Diet  Adequate Fluid Intake: Yes  Caffeine: No  Exercise Frequency: Infrequently  ADL Screening  Hearing - Right Ear: Functional  Hearing - Left Ear: Functional  Bathing: Independent  Dressing: Independent  Walks in Home:  "Independent  IADL's  Managing Finances: Independent  Grocery Shopping: Independent  Taking Medication: Independent  Doing Housework: Independent      Patient Care Team:  Radha Armstrong DO as PCP - General (Family Medicine)  Radha Armstrong DO as PCP - O Medicare Advantage PCP     Review of Systems   Constitutional:  Negative for chills, fatigue and fever.   Respiratory:  Negative for cough, shortness of breath and wheezing.    Cardiovascular:  Negative for chest pain, palpitations and leg swelling.   Gastrointestinal:  Negative for abdominal pain, constipation, diarrhea and nausea.   Genitourinary:  Negative for dysuria, frequency, hematuria and urgency.   Neurological:  Negative for dizziness, numbness and headaches.   Psychiatric/Behavioral:  Negative for dysphoric mood. The patient is not nervous/anxious.        Objective   Vitals:  /84 (BP Location: Right arm, Patient Position: Sitting, BP Cuff Size: Adult)   Pulse 68   Temp 36.2 °C (97.2 °F) (Temporal)   Ht 1.65 m (5' 4.96\")   Wt 59.1 kg (130 lb 6.4 oz)   SpO2 94%   BMI 21.73 kg/m²       Physical Exam  Constitutional:       General: She is not in acute distress.     Appearance: Normal appearance.   HENT:      Head: Normocephalic and atraumatic.      Right Ear: Tympanic membrane and ear canal normal.      Left Ear: Tympanic membrane and ear canal normal.      Mouth/Throat:      Mouth: Mucous membranes are moist.      Pharynx: No posterior oropharyngeal erythema.   Eyes:      Extraocular Movements: Extraocular movements intact.      Pupils: Pupils are equal, round, and reactive to light.   Cardiovascular:      Rate and Rhythm: Normal rate and regular rhythm.      Heart sounds: No murmur heard.  Pulmonary:      Effort: Pulmonary effort is normal. No respiratory distress.      Breath sounds: Normal breath sounds. No wheezing.   Abdominal:      General: Bowel sounds are normal.      Palpations: Abdomen is soft.      Tenderness: There is no " abdominal tenderness. There is no guarding.   Musculoskeletal:         General: Normal range of motion.      Cervical back: Neck supple.   Skin:     General: Skin is warm and dry.   Neurological:      General: No focal deficit present.      Mental Status: She is alert and oriented to person, place, and time.   Psychiatric:         Mood and Affect: Mood normal.         Behavior: Behavior normal.       Assessment/Plan   Problem List Items Addressed This Visit       Hyperlipidemia    Relevant Medications    atorvastatin (Lipitor) 40 mg tablet    HTN (hypertension), benign    Relevant Medications    metoprolol succinate XL (Toprol-XL) 25 mg 24 hr tablet    Exudative age-related macular degeneration, right eye, with active choroidal neovascularization    Supraventricular tachycardia (CMS-HCC)    Relevant Medications    metoprolol succinate XL (Toprol-XL) 25 mg 24 hr tablet     Other Visit Diagnoses       Medicare annual wellness visit, subsequent    -  Primary    Health maintenance examination        Encounter for immunization        Relevant Orders    Pneumococcal conjugate vaccine, 20-valent (PREVNAR 20)    Urge incontinence        Relevant Orders    Referral to Physical Therapy    Asymptomatic postmenopausal state        Relevant Orders    XR DEXA bone density    Depression screening                Fasting blood work reviewed.  We reviewed appropriate preventative health screening guidelines.  We discussed regular aerobic exercise. We discussed proper nutrition and weight control.    Referral to pelvic floor therapy for her urge incontinence.  She may consider urology referral if needed but would like to start here.    Cholesterol good on blood work.  Blood pressure well-controlled, will continue current medication    5-10 minutes were spent screening for depression using PHQ-2/PHQ-9 as documented in the chart

## 2024-11-12 ENCOUNTER — TELEPHONE (OUTPATIENT)
Dept: PRIMARY CARE | Facility: CLINIC | Age: 79
End: 2024-11-12
Payer: MEDICARE

## 2024-11-12 NOTE — TELEPHONE ENCOUNTER
Your orthotics are in.  Please schedule an appointment at your earliest convenience with Dr Xiao to get your new inserts.  You can call [phone] or schedule through [facility].   Thank you.

## 2024-11-13 ENCOUNTER — HOSPITAL ENCOUNTER (OUTPATIENT)
Dept: RADIOLOGY | Facility: CLINIC | Age: 79
Discharge: HOME | End: 2024-11-13
Payer: MEDICARE

## 2024-11-13 DIAGNOSIS — Z78.0 ASYMPTOMATIC POSTMENOPAUSAL STATE: ICD-10-CM

## 2024-11-13 PROCEDURE — 77080 DXA BONE DENSITY AXIAL: CPT | Performed by: RADIOLOGY

## 2024-11-13 PROCEDURE — 77080 DXA BONE DENSITY AXIAL: CPT

## 2024-11-15 ENCOUNTER — OFFICE VISIT (OUTPATIENT)
Dept: URGENT CARE | Age: 79
End: 2024-11-15
Payer: MEDICARE

## 2024-11-15 ENCOUNTER — OFFICE VISIT (OUTPATIENT)
Dept: PODIATRY | Facility: CLINIC | Age: 79
End: 2024-11-15
Payer: MEDICARE

## 2024-11-15 VITALS
OXYGEN SATURATION: 97 % | TEMPERATURE: 98.2 F | RESPIRATION RATE: 16 BRPM | DIASTOLIC BLOOD PRESSURE: 87 MMHG | SYSTOLIC BLOOD PRESSURE: 167 MMHG | HEART RATE: 76 BPM

## 2024-11-15 DIAGNOSIS — I10 HYPERTENSION, ESSENTIAL: ICD-10-CM

## 2024-11-15 DIAGNOSIS — M21.622 TAILOR'S BUNION OF LEFT FOOT: Primary | ICD-10-CM

## 2024-11-15 DIAGNOSIS — H10.9 BACTERIAL CONJUNCTIVITIS OF LEFT EYE: Primary | ICD-10-CM

## 2024-11-15 PROCEDURE — 1036F TOBACCO NON-USER: CPT | Performed by: PODIATRIST

## 2024-11-15 PROCEDURE — 1157F ADVNC CARE PLAN IN RCRD: CPT | Performed by: PODIATRIST

## 2024-11-15 PROCEDURE — 1123F ACP DISCUSS/DSCN MKR DOCD: CPT | Performed by: PODIATRIST

## 2024-11-15 RX ORDER — TOBRAMYCIN 3 MG/ML
2 SOLUTION/ DROPS OPHTHALMIC EVERY 4 HOURS
Qty: 10 ML | Refills: 0 | Status: SHIPPED | OUTPATIENT
Start: 2024-11-15 | End: 2024-11-25

## 2024-11-15 ASSESSMENT — ENCOUNTER SYMPTOMS
VOMITING: 0
NAUSEA: 0
EYE DISCHARGE: 1
WEAKNESS: 0
CRUSTING: 1
EYE INFLAMMATION: 0
EYE WATERING: 0
DOUBLE VISION: 0
NUMBNESS: 0
HEADACHES: 0
EYE ITCHING: 0
BLIND SPOTS: 0
TINGLING: 0
PHOTOPHOBIA: 0
EYE REDNESS: 1
BLURRED VISION: 0
PERI-ORBITAL EDEMA: 0

## 2024-11-15 ASSESSMENT — VISUAL ACUITY: OU: 1

## 2024-11-15 NOTE — PROGRESS NOTES
Subjective   Patient ID: Deena Pop is a 78 y.o. female. They present today with a chief complaint of Eye Problem (Redness).    History of Present Illness  Pt stated she woke with yellow crustiness in the left eye this am.      History provided by:  Patient   used: No    Eye Problem  Location:  Left eye  Severity:  Mild  Onset quality:  Sudden  Duration:  2 hours  Timing:  Constant  Progression:  Unchanged  Chronicity:  New  Context: not burn, not chemical exposure, not contact lens problem, not direct trauma, not foreign body, not using machinery, not scratch, not smoke exposure and not UV exposure    Relieved by:  Nothing  Worsened by:  Nothing  Ineffective treatments:  None tried  Associated symptoms: crusting, discharge and redness    Associated symptoms: no blurred vision, no decreased vision, no double vision, no facial rash, no headaches, no inflammation, no itching, no nausea, no numbness, no photophobia, no scotomas, no swelling, no tearing, no tingling, no vomiting and no weakness    Associated symptoms comment:  Redness in the left eye      Past Medical History  Allergies as of 11/15/2024 - Reviewed 11/15/2024   Allergen Reaction Noted    Sulfa (sulfonamide antibiotics) Unknown 2023       (Not in a hospital admission)       Past Medical History:   Diagnosis Date    Cataract     HL (hearing loss)     Osteoporosis     Stroke (Multi)     Visual impairment        Past Surgical History:   Procedure Laterality Date    ADENOIDECTOMY       SECTION, LOW TRANSVERSE      EYE SURGERY      HERNIA REPAIR      TONSILLECTOMY      TUBAL LIGATION      WISDOM TOOTH EXTRACTION          reports that she has never smoked. She has been exposed to tobacco smoke. She has never used smokeless tobacco. She reports current alcohol use of about 2.0 standard drinks of alcohol per week. She reports that she does not use drugs.    Review of Systems  Review of Systems   Eyes:  Positive for discharge  and redness. Negative for blurred vision, double vision, photophobia and itching.   Gastrointestinal:  Negative for nausea and vomiting.   Neurological:  Negative for tingling, weakness, numbness and headaches.            Objective    Vitals:    11/15/24 0838   BP: 167/87   Pulse: 76   Resp: 16   Temp: 36.8 °C (98.2 °F)   SpO2: 97%     No LMP recorded.    Physical Exam  Vitals and nursing note reviewed.   Constitutional:       Appearance: Normal appearance.   HENT:      Head: Normocephalic and atraumatic.      Right Ear: Hearing, tympanic membrane, ear canal and external ear normal.      Left Ear: Hearing, tympanic membrane, ear canal and external ear normal.      Nose: No nasal deformity, septal deviation, signs of injury, laceration, nasal tenderness, mucosal edema, congestion or rhinorrhea.      Right Sinus: No maxillary sinus tenderness or frontal sinus tenderness.      Left Sinus: No maxillary sinus tenderness or frontal sinus tenderness.      Mouth/Throat:      Lips: Pink.      Mouth: Mucous membranes are moist.      Pharynx: Oropharynx is clear. Uvula midline.      Tonsils: No tonsillar exudate or tonsillar abscesses.   Eyes:      General: Lids are normal. Vision grossly intact. Gaze aligned appropriately. No visual field deficit.        Right eye: No foreign body or discharge.         Left eye: No foreign body or discharge.      Extraocular Movements: Extraocular movements intact.      Conjunctiva/sclera:      Right eye: Right conjunctiva is not injected. No chemosis, exudate or hemorrhage.     Left eye: Left conjunctiva is injected. No chemosis, exudate or hemorrhage.     Comments: Visual acuity 20/40 OD, 20/25 OS and 20/25 OU      Cardiovascular:      Rate and Rhythm: Normal rate and regular rhythm.      Heart sounds: Normal heart sounds.   Pulmonary:      Effort: Pulmonary effort is normal.      Breath sounds: Normal breath sounds and air entry.   Musculoskeletal:      Cervical back: Normal range of  motion and neck supple.   Lymphadenopathy:      Cervical: No cervical adenopathy.   Neurological:      Mental Status: She is alert.   Psychiatric:         Mood and Affect: Mood normal.         Behavior: Behavior normal.         Procedures    Point of Care Test & Imaging Results from this visit  No results found for this visit on 11/15/24.   No results found.    Diagnostic study results (if any) were reviewed by JOANNA Trivedi.    Assessment/Plan   Allergies, medications, history, and pertinent labs/EKGs/Imaging reviewed by JOANNA Trivedi.     Medical Decision Making  Elevated elevated blood pressure readings.  Advised to avoid NSAID and pseudoephedrine and recheck blood pressure at home.  If your blood pressure remains equal to or greater than 140/90, to follow up with primary care provider.   Use the antibiotic eyedrops as instructed. Symptoms should improve in 2 to 3 days.  Take Tylenol and/or ibuprofen as needed for aches and pain and/or fever.  Follow with your eye care specialist ASAP.  Call 911 or go to the nearest emergency room if symptoms became severe such as severe eye pain, trouble seeing, fever of 102.5 degrees Fahrenheit or 39.2 degrees celsius.  Patient verbalized understanding of the instructions and left in stable condition.    Orders and Diagnoses  Diagnoses and all orders for this visit:  Bacterial conjunctivitis of left eye  -     tobramycin (Tobrex) 0.3 % ophthalmic solution; Administer 2 drops into the left eye every 4 hours for 10 days.  Hypertension, essential    Medical Admin Record      Patient disposition: Home    Electronically signed by JOANNA Trivedi  8:51 AM

## 2024-11-15 NOTE — PROGRESS NOTES
CC:  orthotics.     HPI:  Pt presents to  orthotics        PCP: Dr. Armstrong  Last visit: 5-14-24      PMH  Medical History           Past Medical History:   Diagnosis Date    Cataract      HL (hearing loss)      Osteoporosis      Stroke (CMS/HCC)      Visual impairment           MEDS     Current Outpatient Medications:     aspirin 81 mg EC tablet, Take 1 tablet (81 mg) by mouth once daily., Disp: , Rfl:     atorvastatin (Lipitor) 40 mg tablet, Take 1 tablet (40 mg) by mouth once daily. At bedtime, Disp: 90 tablet, Rfl: 1    b complex vitamins (Vitamins B Complex) capsule, Take 1 capsule by mouth once daily., Disp: , Rfl:     calcium carbonate 600 mg calcium (1,500 mg) tablet, Take 1 tablet (1,500 mg) by mouth once daily., Disp: , Rfl:     cholecalciferol (Vitamin D-3) 125 MCG (5000 UT) capsule, Take 1 capsule (125 mcg) by mouth once daily., Disp: , Rfl:     famotidine (Pepcid) 20 mg tablet, Take 1 tablet (20 mg) by mouth as needed at bedtime for indigestion., Disp: , Rfl:     metoprolol succinate XL (Toprol-XL) 25 mg 24 hr tablet, TAKE 1 TABLET DAILY (DO NOT CRUSH OR CHEW), Disp: 90 tablet, Rfl: 1  Allergies          Allergies   Allergen Reactions    Sulfa (Sulfonamide Antibiotics) Unknown      Social History   Social History                Socioeconomic History    Marital status:        Spouse name: None    Number of children: None    Years of education: None    Highest education level: None   Occupational History    None   Tobacco Use    Smoking status: Never       Passive exposure: Past    Smokeless tobacco: Never   Vaping Use    Vaping Use: Never used   Substance and Sexual Activity    Alcohol use: Yes       Alcohol/week: 2.0 standard drinks of alcohol       Types: 1 Cans of beer, 1 Standard drinks or equivalent per week       Comment: very rare    Drug use: Never    Sexual activity: None   Other Topics Concern    None   Social History Narrative    None      Social Determinants of Health       Financial Resource Strain: Not on file   Food Insecurity: Not on file   Transportation Needs: Not on file   Physical Activity: Not on file   Stress: Not on file   Social Connections: Not on file   Intimate Partner Violence: Not on file   Housing Stability: Not on file         Family History               Family History   Problem Relation Name Age of Onset    Hypertension Mother        Hypertension Father        Colon cancer Mother's Brother        Stroke Maternal Grandmother             Surgical History             Past Surgical History:   Procedure Laterality Date    ADENOIDECTOMY         SECTION, LOW TRANSVERSE        EYE SURGERY        HERNIA REPAIR        TONSILLECTOMY        TUBAL LIGATION        WISDOM TOOTH EXTRACTION                REVIEW OF SYSTEMS     DERM:   + as noted in HPI.         Physical examination:   On General Observation: Patient is a pleasant, cooperative, well developed 78 y.o.  adult female. The patient is alert and oriented to time, place and person. Patient has normal affect and mood.  There were no vitals taken for this visit.     Vascular:  DP and PT pulses are 2/4 b/l.  mild edema noted. mild varicosities b/l.  CFT  6 seconds to all digits bilateral.  Skin temperature is warm to warm from proximal to distal bilateral.       Muscular: Strength is 5/5 for all instrinsic and extrinsic muscle groups.      Neuro:  Proprioception present.   Sensation to vibration is  present. Protective sensation intact  at all pedal sites via [place] Ricky 5.07 monofilament bilateral.  Light touch present bilateral.        Ortho:  Prominence is present lateral 5th metatarsal head, loss of the plantar fat pad.     ASSESSMENT:       Tailors bunion left          PLAN:      Dispensed orthotics with written and oral home going instructions, follow up 4 weeks if any issues.        Piter Xiao DPM

## 2024-11-22 DIAGNOSIS — M81.0 AGE-RELATED OSTEOPOROSIS WITHOUT CURRENT PATHOLOGICAL FRACTURE: Primary | ICD-10-CM

## 2024-11-22 RX ORDER — ALENDRONATE SODIUM 70 MG/1
70 TABLET ORAL
Qty: 12 TABLET | Refills: 3 | Status: SHIPPED | OUTPATIENT
Start: 2024-11-22 | End: 2025-11-22

## 2024-11-27 ENCOUNTER — EVALUATION (OUTPATIENT)
Dept: PHYSICAL THERAPY | Facility: CLINIC | Age: 79
End: 2024-11-27
Payer: MEDICARE

## 2024-11-27 DIAGNOSIS — N39.41 URGE INCONTINENCE: Primary | ICD-10-CM

## 2024-11-27 PROCEDURE — 97110 THERAPEUTIC EXERCISES: CPT | Mod: GP

## 2024-11-27 PROCEDURE — 97535 SELF CARE MNGMENT TRAINING: CPT | Mod: GP

## 2024-11-27 PROCEDURE — 97161 PT EVAL LOW COMPLEX 20 MIN: CPT | Mod: GP

## 2024-11-27 ASSESSMENT — PAIN SCALES - GENERAL: PAINLEVEL_OUTOF10: 0 - NO PAIN

## 2024-11-27 ASSESSMENT — ENCOUNTER SYMPTOMS
DEPRESSION: 0
OCCASIONAL FEELINGS OF UNSTEADINESS: 0
LOSS OF SENSATION IN FEET: 0

## 2024-11-27 ASSESSMENT — PAIN - FUNCTIONAL ASSESSMENT: PAIN_FUNCTIONAL_ASSESSMENT: 0-10

## 2024-11-27 NOTE — PROGRESS NOTES
Physical Therapy    EVALUATION AND TREATMENT    Name: Deena Pop  MRN: [id number]  : 1945  Today's Date: 24     Time Calculation  Start Time: 1443  Stop Time: 1538  Time Calculation (min): 55 min    Assessment:    Pt is an 79 year old female presenting to the clinic with worsening urge and stress incontinence. She is losing a small amount of urine with coughing/laughing/sneezing but also with the transition from sit to stand. She says it occurs after she has been sitting for a long time. This is associated with increased urgency. Pt is able to hold urine while on the way to the bathroom. She has overall excellent strength and ROM for age with minimal prolapse symptoms contributing to dysfunction. Anticipate this is from decreased vaginal tone and strength. Pt could benefit from continued PT to address these issues and improve her quality of life.     Insurance:  Visit number: 1  Insurance Type: [employer] [place] [employer] [place] Medicare   Approved # of visits: MN  Authorization Needed: no  Cert Date Ends On:     Plan:   PT Plan: Skilled PT  PT Frequency: 1 time per week  Rehab Potential: Excellent  Plan of Care Agreement: Patient  Planned interventions include: biofeedback, cryotherapy, education/instruction, electrical stimulation, gait training, home program, hot pack, kinesiotaping, manual therapy, neuromuscular re-education, self care/home management, therapeutic activities, and therapeutic exercises.   Access Code: [email or web address]  URL: [email or web address]  Date: 2024  Prepared by: Chantel Mclaughlin    Exercises  - Supine Bridge  - 1 x daily - 7 x weekly - 2 sets - 10 reps - 5 seconds  hold  - Supine Lower Trunk Rotation  - 1 x daily - 7 x weekly - 2 sets - 10 reps - 5 seconds  hold  - Seated Isometric Hip Abduction with Belt  - 1 x daily - 7 x weekly - 2 sets - 10 reps - 5 seconds  hold  - Seated Hip Adduction Isometrics with Ball  - 1 x daily - 7 x weekly - 2 sets - 10 reps - 5 seconds   hold    Current Problem:  1. Urge incontinence  Referral to Physical Therapy    Follow Up In Physical Therapy          Subjective   General:  Reason for Referral: Urge incontinence  Referred By: Dr. Armstrong  Chief complaint: progressive urine loss     This has been ongoing for a long time. Now, she is losing a little bit of urine more consistently. She has increased urgency and has not been able to hold it on the way to the bathroom.   Daytime frequency: 5   Night time frequency: 0-1  Liners: 2 per day, 1 per night   Leakage: standing up with urgency   Urgency: occurs with overfull bladder, sitting for long periods of time   Complete emptying: yes   Starting your stream: no difficulty     Ob Gyn:   Entered menopause mid 60's (pt is unsure)   Has taken hormone replacement   Not sexually active   2 children - one c section,    Hernia repair remotely   No other abdominal surgery     Precautions:  Precautions Comment: none     PMH: HLD, HTN, supraventricular tachycardia, osteoporosis, shingles  Fall Risk: no  Pain:  Pain Assessment: 0-10  0-10 (Numeric) Pain Score: 0 - No pain    Objective   External and internal assessment explained. Verbal consent obtained to proceed with vaginal or rectal exam and consented for the treatment approaches today. Patient understands they have power and right to stop examination at any time.     External Vaginal Observation:  Voluntary Contraction:+  Voluntary Relaxation:+  Involuntary Contraction: +   Involuntary Relaxation: +  Increased vulvar girth   + Vaginal atrophy  Narrow introitus   No descent with coughing    External Vaginal Palpation: no tender points   1 o'clock (ischiocavernosus)  2 o'clock (bulbospongiosus)  3 o'clock (superficial transverse perineal)  4 o'clock (pubococcygeus)  5 o'clock (iliococcygeus)  6 o'clock (coccyx)  7 o'clock (iliococcygeus)  8 o'clock (pubococcygeus)  9 o'clock (superficial transverse perineal)  10 o'clock (bulbocavernosus)  11 o'clock  (ischiocavernosus)  12 o'clock (pubic symphysis inferior angle)  Obturator:  Piriformis:    Internal Vaginal palpation: no trigger points   1 o'clock (ischiocavernosus)  2 o'clock (bulbocspongiosus)  3 o'clock (superficial transverse perineal)  4 o'clock (pubococcygeus)  5 o'clock (iliococcygeus)  6 o'clock (coccyx)  7 o'clock (iliococcygeus)  8 o'clock (pubococcygeus)  9 o'clock (superficial transverse perineal)  10 o'clock (bulbocavernosus)  11 o'clock (ischiocavernosus)  12 o'clock (pubic symphysis inferior angle)  Obturator:  Piriformis:    Pelvic Floor MMT Grade  0/zero: no palpable contraction/squeeze  1/trace: flicker of squeeze or contraction  2/poor: squeeze pressure asymmetrical or felt at various points- no lift or displacement  3/fair: squeeze pressure (contraction) and lift or displacement  4/good: squeeze pressure (contraction) and lift or displacement from anterior, posterior, and side walls  5/strong: full circumference of finger compressed, displaced with an inward pull    [place] PERF(Power/Endurance/Repetitions/Fast Twitch)  3/5/1/6    Ortho Screen:  AROM:  Trunk and spine within expected levels for age     PROM/Joint Mobility:  Decreased L hip ER, R hip IR    Pain with R hip ER   Strength:    Special Tests:  + ZACARIAS   Palpation:  No reproduction of symptoms over abdominal viscera   Observation:   Gait: slight forward trunk lean, ambulate with appropriate step and stride length without device  SL stance:    Outcome Measure:   NIH CPSI pain 0 NIH CPSI urinary 1 NIH CPSI quality of life 5     Careplan Goals:  1. Pt will be independent in HEP to maximize PT POC   2. Pt will be able to improve worst pain severity on NPRS by >2 points MCID   3. Pt will improve NIH CPSI by >50% raw score  4. Pt will improve PF strength to 5/5 in order to stabilize positional transfers   5. Pt will be able to reduce urinary urgency to <10% of weekly voids   6. Pt will be able to reduce size of liners (to size 1)  indicating improved control     Desirae Mclaughlin, PT

## 2024-12-19 ENCOUNTER — TREATMENT (OUTPATIENT)
Dept: PHYSICAL THERAPY | Facility: CLINIC | Age: 79
End: 2024-12-19
Payer: MEDICARE

## 2024-12-19 DIAGNOSIS — N39.41 URGE INCONTINENCE: ICD-10-CM

## 2024-12-19 PROCEDURE — 97110 THERAPEUTIC EXERCISES: CPT | Mod: GP

## 2024-12-19 NOTE — PROGRESS NOTES
PHYSICAL THERAPY   TREATMENT NOTE    Patient Name:  Deena Pop   Patient MRN: [id number]  Date: 12/19/24    Time Calculation  Start Time: 0945  Stop Time: 1028  Time Calculation (min): 43 min    Insurance:  Visit number: 2  Insurance Type: [employer] [place] [employer] [place] Medicare   Approved # of visits: MN  Authorization Needed: no  Cert Date Ends On: 2-25-25    General:  Reason for visit: progressive urine loss   Referred by: Dr. Armstrong     Therapy diagnoses:   1. Urge incontinence  Follow Up In Physical Therapy           Assessment:    Anticipate passive leakage coming from abnormal bladder support and control and may benefit from pessary or urogyn referral     Pain levels were unchanged at the end of the treatment.  Plan:  1) Refer patient to urogynecology if passive leakage is the biggest priority   2) Will likely not see unless patient has worsening of symptoms     Subjective:  Noticing 50% improvement in urge when she is sitting. She is having a little less leakage on the way to bathroom. Leakage on the way to the bathroom happens 3-4 times per week.   Her biggest complaint is passive leakage throughout the day   Pain (0-10): 0    Precautions:  PMH: HLD, HTN, supraventricular tachycardia, osteoporosis, shingles  Fall Risk: no    Objective:    Treatment Performed:   Therapeutic Exercise:  43 minutes  LTR x 2 minutes   Ball rolls into flexion 2 x 2 mins  90-90 kegel awareness   Standing hip abduction yellow 2 x 10 R/L  Standing hip extension yellow 2 x 10 R/L  LAQ with kegel and adductor squeeze 2 x 10   Sit to stand x 10   Seated kegel training   Standing kegel training   Therapeutic Activity:   minutes     Self Care:   minutes    Manual Therapy:   minutes    Neuromuscular Re-education:   minutes    Gait Training:    minutes    Modalities:    minutes    Dry Needling:   minutes

## 2025-01-20 ENCOUNTER — APPOINTMENT (OUTPATIENT)
Dept: PODIATRY | Facility: CLINIC | Age: 80
End: 2025-01-20
Payer: MEDICARE

## 2025-01-20 DIAGNOSIS — M79.675 TOE PAIN, LEFT: ICD-10-CM

## 2025-01-20 DIAGNOSIS — M79.674 TOE PAIN, RIGHT: ICD-10-CM

## 2025-01-20 DIAGNOSIS — B35.1 TINEA UNGUIUM: Primary | ICD-10-CM

## 2025-01-20 PROCEDURE — 1160F RVW MEDS BY RX/DR IN RCRD: CPT | Performed by: PODIATRIST

## 2025-01-20 PROCEDURE — 1123F ACP DISCUSS/DSCN MKR DOCD: CPT | Performed by: PODIATRIST

## 2025-01-20 PROCEDURE — 1036F TOBACCO NON-USER: CPT | Performed by: PODIATRIST

## 2025-01-20 PROCEDURE — 11721 DEBRIDE NAIL 6 OR MORE: CPT | Performed by: PODIATRIST

## 2025-01-20 PROCEDURE — 1159F MED LIST DOCD IN RCRD: CPT | Performed by: PODIATRIST

## 2025-01-20 PROCEDURE — 1157F ADVNC CARE PLAN IN RCRD: CPT | Performed by: PODIATRIST

## 2025-01-20 NOTE — PROGRESS NOTES
CC:  painful thickened and elongated toenails, orthotics.     HPI:  Pt presents complaining painful thickened and elongated toenails that are difficult to manage.  Onset was gradual with worsening course until recently.  Aggravated by shoe gear and ambulation. Also pain bottom of the left foot. Needs new orthotics.        PCP: Dr. Armstrong  Last visit: 11-11-24     PMH  Medical History           Past Medical History:   Diagnosis Date    Cataract      HL (hearing loss)      Osteoporosis      Stroke (CMS/HCC)      Visual impairment           MEDS     Current Outpatient Medications:     aspirin 81 mg EC tablet, Take 1 tablet (81 mg) by mouth once daily., Disp: , Rfl:     atorvastatin (Lipitor) 40 mg tablet, Take 1 tablet (40 mg) by mouth once daily. At bedtime, Disp: 90 tablet, Rfl: 1    b complex vitamins (Vitamins B Complex) capsule, Take 1 capsule by mouth once daily., Disp: , Rfl:     calcium carbonate 600 mg calcium (1,500 mg) tablet, Take 1 tablet (1,500 mg) by mouth once daily., Disp: , Rfl:     cholecalciferol (Vitamin D-3) 125 MCG (5000 UT) capsule, Take 1 capsule (125 mcg) by mouth once daily., Disp: , Rfl:     famotidine (Pepcid) 20 mg tablet, Take 1 tablet (20 mg) by mouth as needed at bedtime for indigestion., Disp: , Rfl:     metoprolol succinate XL (Toprol-XL) 25 mg 24 hr tablet, TAKE 1 TABLET DAILY (DO NOT CRUSH OR CHEW), Disp: 90 tablet, Rfl: 1  Allergies          Allergies   Allergen Reactions    Sulfa (Sulfonamide Antibiotics) Unknown      Social History   Social History                Socioeconomic History    Marital status:        Spouse name: None    Number of children: None    Years of education: None    Highest education level: None   Occupational History    None   Tobacco Use    Smoking status: Never       Passive exposure: Past    Smokeless tobacco: Never   Vaping Use    Vaping Use: Never used   Substance and Sexual Activity    Alcohol use: Yes       Alcohol/week: 2.0 standard drinks  of alcohol       Types: 1 Cans of beer, 1 Standard drinks or equivalent per week       Comment: very rare    Drug use: Never    Sexual activity: None   Other Topics Concern    None   Social History Narrative    None      Social Determinants of Health      Financial Resource Strain: Not on file   Food Insecurity: Not on file   Transportation Needs: Not on file   Physical Activity: Not on file   Stress: Not on file   Social Connections: Not on file   Intimate Partner Violence: Not on file   Housing Stability: Not on file         Family History               Family History   Problem Relation Name Age of Onset    Hypertension Mother        Hypertension Father        Colon cancer Mother's Brother        Stroke Maternal Grandmother             Surgical History             Past Surgical History:   Procedure Laterality Date    ADENOIDECTOMY         SECTION, LOW TRANSVERSE        EYE SURGERY        HERNIA REPAIR        TONSILLECTOMY        TUBAL LIGATION        WISDOM TOOTH EXTRACTION                REVIEW OF SYSTEMS     DERM:   + as noted in HPI.         Physical examination:   On General Observation: Patient is a pleasant, cooperative, well developed 78 y.o.  adult female. The patient is alert and oriented to time, place and person. Patient has normal affect and mood.  There were no vitals taken for this visit.     Vascular:  DP and PT pulses are 2/4 b/l.  mild edema noted. mild varicosities b/l.  CFT  6 seconds to all digits bilateral.  Skin temperature is warm to warm from proximal to distal bilateral.       Muscular: Strength is 5/5 for all instrinsic and extrinsic muscle groups.      Neuro:  Proprioception present.   Sensation to vibration is  present. Protective sensation intact  at all pedal sites via Gardner Ricky 5.07 monofilament bilateral.  Light touch present bilateral.      Derm:    Decreased hair growth b/l le  Left toenails: 1-5 Brittleness, crumbling upon debridement, subungual debris, elongation,  mycotic appearance, tenderness, and thickness.   Right toenails: 1-5 Brittleness, crumbling upon debridement, subungual debris, elongation, mycotic appearance, tenderness, and thickness.      Ortho:  Prominence is present lateral 5th metatarsal head, loss of the plantar fat pad.     ASSESSMENT:       Tailors bunion left  Tinea Unguium [B35.1]   Pain in right toe(s) [M79.674]   Pain in left toe(s) [M79.675]         PLAN:      - Debrided toenails 1-10 in length and height.   - Follow up in 9-12 weeks.         Piter Xiao DPM

## 2025-01-23 ENCOUNTER — APPOINTMENT (OUTPATIENT)
Facility: CLINIC | Age: 80
End: 2025-01-23
Payer: MEDICARE

## 2025-01-23 VITALS
HEART RATE: 70 BPM | SYSTOLIC BLOOD PRESSURE: 133 MMHG | WEIGHT: 124 LBS | BODY MASS INDEX: 20.66 KG/M2 | DIASTOLIC BLOOD PRESSURE: 81 MMHG | HEIGHT: 65 IN | TEMPERATURE: 97 F

## 2025-01-23 DIAGNOSIS — H53.8 BLURRED VISION: ICD-10-CM

## 2025-01-23 DIAGNOSIS — I63.9 CEREBROVASCULAR ACCIDENT (CVA), UNSPECIFIED MECHANISM (MULTI): ICD-10-CM

## 2025-01-23 DIAGNOSIS — R42 VERTIGO: ICD-10-CM

## 2025-01-23 DIAGNOSIS — G31.84 MILD COGNITIVE IMPAIRMENT: Primary | ICD-10-CM

## 2025-01-23 PROCEDURE — 1123F ACP DISCUSS/DSCN MKR DOCD: CPT | Performed by: PSYCHIATRY & NEUROLOGY

## 2025-01-23 PROCEDURE — 3079F DIAST BP 80-89 MM HG: CPT | Performed by: PSYCHIATRY & NEUROLOGY

## 2025-01-23 PROCEDURE — 1160F RVW MEDS BY RX/DR IN RCRD: CPT | Performed by: PSYCHIATRY & NEUROLOGY

## 2025-01-23 PROCEDURE — 99214 OFFICE O/P EST MOD 30 MIN: CPT | Performed by: PSYCHIATRY & NEUROLOGY

## 2025-01-23 PROCEDURE — 1157F ADVNC CARE PLAN IN RCRD: CPT | Performed by: PSYCHIATRY & NEUROLOGY

## 2025-01-23 PROCEDURE — 3075F SYST BP GE 130 - 139MM HG: CPT | Performed by: PSYCHIATRY & NEUROLOGY

## 2025-01-23 PROCEDURE — 1126F AMNT PAIN NOTED NONE PRSNT: CPT | Performed by: PSYCHIATRY & NEUROLOGY

## 2025-01-23 PROCEDURE — 1036F TOBACCO NON-USER: CPT | Performed by: PSYCHIATRY & NEUROLOGY

## 2025-01-23 PROCEDURE — 1159F MED LIST DOCD IN RCRD: CPT | Performed by: PSYCHIATRY & NEUROLOGY

## 2025-01-23 PROCEDURE — G2211 COMPLEX E/M VISIT ADD ON: HCPCS | Performed by: PSYCHIATRY & NEUROLOGY

## 2025-01-23 ASSESSMENT — ENCOUNTER SYMPTOMS
DIZZINESS: 1
OCCASIONAL FEELINGS OF UNSTEADINESS: 0
FEVER: 0
HALLUCINATIONS: 0
EYE PAIN: 0
DEPRESSION: 0
HEADACHES: 0
LOSS OF SENSATION IN FEET: 0

## 2025-01-23 ASSESSMENT — PAIN SCALES - GENERAL: PAINLEVEL_OUTOF10: 0-NO PAIN

## 2025-01-23 NOTE — PROGRESS NOTES
"  Subjective     Deena Pop is a 79 y.o. year old female here for dizziness/ TIA follow up.    HPI  Last visit was February.    Had one episode of vertigo x 10 minutes and blurred vision recently that resolved 20 minutes.   MRI brain results were reviewed with her over the phone few days ago.  She reports continued dizziness.  Memory is stable.  \" Is okay\".  She has trouble with word finding difficulty.   Hx of SVT, had stroke years ago in 2017 had cerebellar stroke (  [place]) the MRA showed the atherosclerotic disease. She has had no symptoms since then.   She is on ASA 81mg daily and lipitor 40mg daily.   Carotid US and cT head were unremarkable.  LDL 49.   Heart monitor showed no AFIB.   FH: father had enlarged heart, her mother broke her hip in 70s.   A1c 5.7.    Review of Systems   Constitutional:  Negative for fever.   HENT:  Negative for ear pain.    Eyes:  Negative for pain.   Neurological:  Positive for dizziness. Negative for syncope and headaches.   Psychiatric/Behavioral:  Negative for hallucinations and self-injury.    All other systems reviewed and are negative.      Patient Active Problem List   Diagnosis    Hyperlipidemia    Osteoporosis    HTN (hypertension), benign    Shingles    Exudative age-related macular degeneration, right eye, with active choroidal neovascularization    Supraventricular tachycardia (CMS-HCC)     Past Medical History:   Diagnosis Date    Cataract     HL (hearing loss)     Osteoporosis     Stroke (Multi)     Visual impairment      Past Surgical History:   Procedure Laterality Date    ADENOIDECTOMY       SECTION, LOW TRANSVERSE      EYE SURGERY      HERNIA REPAIR      TONSILLECTOMY      TUBAL LIGATION      WISDOM TOOTH EXTRACTION       Social History     Tobacco Use    Smoking status: Never     Passive exposure: Past    Smokeless tobacco: Never   Substance Use Topics    Alcohol use: Yes     Alcohol/week: 2.0 standard drinks of alcohol     Types: 1 Cans of beer, " 1 Standard drinks or equivalent per week     Comment: very rare     family history includes Colon cancer in her mother's brother; Hypertension in her father and mother; Stroke in her maternal grandmother.    Current Outpatient Medications:     alendronate (Fosamax) 70 mg tablet, Take 1 tablet (70 mg) by mouth every 7 days. Take in the morning with a full glass of water, on an empty stomach, and do not take anything else by mouth or lie down for the next 30 min., Disp: 12 tablet, Rfl: 3    aspirin 81 mg EC tablet, Take 1 tablet (81 mg) by mouth once daily., Disp: , Rfl:     atorvastatin (Lipitor) 40 mg tablet, Take 1 tablet (40 mg) by mouth once daily. At bedtime, Disp: 90 tablet, Rfl: 3    b complex vitamins (Vitamins B Complex) capsule, Take 1 capsule by mouth once daily., Disp: , Rfl:     calcium carbonate 600 mg calcium (1,500 mg) tablet, Take 1 tablet (1,500 mg) by mouth once daily., Disp: , Rfl:     cholecalciferol (Vitamin D-3) 125 MCG (5000 UT) capsule, Take 1 capsule (125 mcg) by mouth once daily., Disp: , Rfl:     diphenhydrAMINE (BenadryL) 25 mg capsule, Take 1 capsule (25 mg) by mouth as needed at bedtime for itching., Disp: , Rfl:     famotidine (Pepcid) 20 mg tablet, Take 1 tablet (20 mg) by mouth as needed at bedtime for indigestion., Disp: , Rfl:     metoprolol succinate XL (Toprol-XL) 25 mg 24 hr tablet, TAKE 1 TABLET DAILY (DO NOT CRUSH OR CHEW), Disp: 90 tablet, Rfl: 1  Allergies   Allergen Reactions    Sulfa (Sulfonamide Antibiotics) Unknown     There were no vitals taken for this visit.  Neurological Exam/Physical Exam:    Constitutional: General appearance: no acute distress. Pleasant.   Auscultation of Heart: Regular rate and rhythm, no murmurs, normal S1 and S2.   Carotid Arteries: no bruits  Peripheral Vascular Exam: No swelling, edema or tenderness to palpation in extremities  Mental status: no distress, alert, interactive and cooperative. Affect is appropriate.   Orientation: oriented to  person, oriented to place and oriented to time.   Memory: recent memory intact and remote memory intact.   Attention: normal attention /concentration.   Language: normal comprehension and naming.   Fund of knowledge: Patient displays adequate knowledge of current events.  Eyes: The ophthalmoscopic examination was normal.   Cranial nerve II: Visual fields full to confrontation.   Cranial nerves III, IV, and VI: Pupils round, equally reactive to light; EOMs intact. No nystagmus.   Cranial Nerve V: Facial sensation intact to LT bilaterally.   Cranial nerve VII: no facial droop  Cranial nerve VIII: Hearing is intact  Cranial nerves IX and X: Palate elevates symmetrically.   Cranial nerve XI: Shoulder shrug intact.   Cranial nerve XII: Tongue is midline.  Motor:  Strength is normal.   Deep Tendon Reflexes: left biceps 2+ , right biceps 2+, left triceps 2+, right triceps 2+, left brachioradialis 2+, right brachioradialis 2+, left patella 2+, right patella 2+, left ankle jerk 2+, right ankle jerk 2+   Plantar Reflex: Toes downgoing to plantar stimulation on the left. Toes downgoing to plantar stimulation on the right.   Sensory Exam: Normal to vibratory sensation  Coordination:  no limb dystaxia   Gait:  cautious.         Labs:  CBC:   Lab Results   Component Value Date    WBC 4.1 (L) 11/04/2024    HGB 15.5 11/04/2024    HCT 46.6 (H) 11/04/2024     11/04/2024     BMP:   Lab Results   Component Value Date     11/04/2024    K 3.8 11/04/2024     11/04/2024    CO2 31 11/04/2024    BUN 16 11/04/2024    CREATININE 0.75 11/04/2024    CALCIUM 9.0 11/04/2024    MG 2.14 10/13/2023     LFT:   Lab Results   Component Value Date    ALKPHOS 57 11/04/2024    BILITOT 0.6 11/04/2024    BILIDIR 0.2 11/10/2023    PROT 6.8 11/04/2024    ALBUMIN 4.1 11/04/2024    ALT 27 11/04/2024    AST 23 11/04/2024     MRI brain shows diffuse volume loss and chronic small vessel disease. Prior unchanged cavernous angioma in L temporal  lobe.  B12 and TSH WNL.     Assessment/Plan   Problem List Items Addressed This Visit    None  Repeat the MRI brain due to new issues with vertigo and blurred vision.   Continue aspirin and lipitor.   Monitor BP at home.

## 2025-01-23 NOTE — PATIENT INSTRUCTIONS
"It was a pleasure seeing you today.     I want you to get a MRI Brain- Please call radiology to schedule at [phone].   If the results are abnormal, I will call you to discuss.    Please make a follow up appointment as needed.    For any urgent issues or needing to speak to a medical assistant please call [phone], option 6 during our office hours Monday-Friday 8am-4pm, and leave a voicemail with your concern.  My office will try to reach back you as soon as possible within 24 (business) hours.  If you have an emergency please call 911 or visit a local urgent care or nearest emergency room.      Please understand that [employer] is a useful communication tool for simple \"normal\" results or a refill request but I would not recommend using this tool for emergent or urgent issues or for conversations with me.  I am happy to ask my staff to rearrange a follow up visit or a virtual visit sooner than requested if appropriate for your care.    "

## 2025-02-03 ENCOUNTER — HOSPITAL ENCOUNTER (OUTPATIENT)
Dept: RADIOLOGY | Facility: CLINIC | Age: 80
Discharge: HOME | End: 2025-02-03
Payer: MEDICARE

## 2025-02-03 DIAGNOSIS — H53.8 BLURRED VISION: ICD-10-CM

## 2025-02-03 DIAGNOSIS — I63.9 CEREBROVASCULAR ACCIDENT (CVA), UNSPECIFIED MECHANISM (MULTI): ICD-10-CM

## 2025-02-03 DIAGNOSIS — R42 VERTIGO: ICD-10-CM

## 2025-02-03 PROCEDURE — 70551 MRI BRAIN STEM W/O DYE: CPT

## 2025-02-03 PROCEDURE — 70551 MRI BRAIN STEM W/O DYE: CPT | Performed by: RADIOLOGY

## 2025-03-22 ENCOUNTER — APPOINTMENT (OUTPATIENT)
Dept: RADIOLOGY | Facility: HOSPITAL | Age: 80
End: 2025-03-22
Payer: MEDICARE

## 2025-03-22 ENCOUNTER — APPOINTMENT (OUTPATIENT)
Dept: CARDIOLOGY | Facility: HOSPITAL | Age: 80
End: 2025-03-22
Payer: MEDICARE

## 2025-03-22 ENCOUNTER — HOSPITAL ENCOUNTER (EMERGENCY)
Facility: HOSPITAL | Age: 80
Discharge: HOME | End: 2025-03-22
Payer: MEDICARE

## 2025-03-22 VITALS
SYSTOLIC BLOOD PRESSURE: 147 MMHG | HEIGHT: 65 IN | BODY MASS INDEX: 19.99 KG/M2 | RESPIRATION RATE: 18 BRPM | TEMPERATURE: 97.9 F | WEIGHT: 120 LBS | OXYGEN SATURATION: 97 % | HEART RATE: 60 BPM | DIASTOLIC BLOOD PRESSURE: 77 MMHG

## 2025-03-22 DIAGNOSIS — R25.1 TREMOR, UNSPECIFIED: Primary | ICD-10-CM

## 2025-03-22 DIAGNOSIS — E87.6 HYPOKALEMIA: ICD-10-CM

## 2025-03-22 DIAGNOSIS — I10 PRIMARY HYPERTENSION: ICD-10-CM

## 2025-03-22 LAB
ALBUMIN SERPL BCP-MCNC: 4.5 G/DL (ref 3.4–5)
ALP SERPL-CCNC: 50 U/L (ref 33–136)
ALT SERPL W P-5'-P-CCNC: 32 U/L (ref 7–45)
ANION GAP SERPL CALC-SCNC: 13 MMOL/L (ref 10–20)
APPEARANCE UR: ABNORMAL
AST SERPL W P-5'-P-CCNC: 32 U/L (ref 9–39)
BACTERIA #/AREA URNS AUTO: ABNORMAL /HPF
BASOPHILS # BLD AUTO: 0.03 X10*3/UL (ref 0–0.1)
BASOPHILS NFR BLD AUTO: 0.7 %
BILIRUB SERPL-MCNC: 0.6 MG/DL (ref 0–1.2)
BILIRUB UR STRIP.AUTO-MCNC: NEGATIVE MG/DL
BUN SERPL-MCNC: 17 MG/DL (ref 6–23)
CALCIUM SERPL-MCNC: 9.4 MG/DL (ref 8.6–10.3)
CARDIAC TROPONIN I PNL SERPL HS: 11 NG/L (ref 0–13)
CARDIAC TROPONIN I PNL SERPL HS: 12 NG/L (ref 0–13)
CHLORIDE SERPL-SCNC: 102 MMOL/L (ref 98–107)
CO2 SERPL-SCNC: 29 MMOL/L (ref 21–32)
COLOR UR: YELLOW
CREAT SERPL-MCNC: 0.74 MG/DL (ref 0.5–1.05)
EGFRCR SERPLBLD CKD-EPI 2021: 82 ML/MIN/1.73M*2
EOSINOPHIL # BLD AUTO: 0.13 X10*3/UL (ref 0–0.4)
EOSINOPHIL NFR BLD AUTO: 2.9 %
ERYTHROCYTE [DISTWIDTH] IN BLOOD BY AUTOMATED COUNT: 12.8 % (ref 11.5–14.5)
GLUCOSE SERPL-MCNC: 94 MG/DL (ref 74–99)
GLUCOSE UR STRIP.AUTO-MCNC: NEGATIVE MG/DL
HCT VFR BLD AUTO: 45 % (ref 36–46)
HGB BLD-MCNC: 14.9 G/DL (ref 12–16)
HOLD SPECIMEN: NORMAL
IMM GRANULOCYTES # BLD AUTO: 0.01 X10*3/UL (ref 0–0.5)
IMM GRANULOCYTES NFR BLD AUTO: 0.2 % (ref 0–0.9)
KETONES UR STRIP.AUTO-MCNC: NEGATIVE MG/DL
LABORATORY COMMENT REPORT: ABNORMAL
LEUKOCYTE ESTERASE UR QL STRIP.AUTO: ABNORMAL
LYMPHOCYTES # BLD AUTO: 2.06 X10*3/UL (ref 0.8–3)
LYMPHOCYTES NFR BLD AUTO: 46.3 %
MAGNESIUM SERPL-MCNC: 2.03 MG/DL (ref 1.6–2.4)
MCH RBC QN AUTO: 32 PG (ref 26–34)
MCHC RBC AUTO-ENTMCNC: 33.1 G/DL (ref 32–36)
MCV RBC AUTO: 97 FL (ref 80–100)
MONOCYTES # BLD AUTO: 0.35 X10*3/UL (ref 0.05–0.8)
MONOCYTES NFR BLD AUTO: 7.9 %
NEUTROPHILS # BLD AUTO: 1.87 X10*3/UL (ref 1.6–5.5)
NEUTROPHILS NFR BLD AUTO: 42 %
NITRITE UR QL STRIP.AUTO: NEGATIVE
NRBC BLD-RTO: 0 /100 WBCS (ref 0–0)
PH UR STRIP.AUTO: 8 [PH]
PHOSPHATE SERPL-MCNC: 3.4 MG/DL (ref 2.5–4.9)
PLATELET # BLD AUTO: 238 X10*3/UL (ref 150–450)
POTASSIUM SERPL-SCNC: 3.4 MMOL/L (ref 3.5–5.3)
PROT SERPL-MCNC: 7.4 G/DL (ref 6.4–8.2)
PROT UR STRIP.AUTO-MCNC: NEGATIVE MG/DL
RBC # BLD AUTO: 4.65 X10*6/UL (ref 4–5.2)
RBC # UR STRIP.AUTO: NEGATIVE MG/DL
RBC #/AREA URNS AUTO: ABNORMAL /HPF
SODIUM SERPL-SCNC: 141 MMOL/L (ref 136–145)
SP GR UR STRIP.AUTO: 1.02
SQUAMOUS #/AREA URNS AUTO: ABNORMAL /HPF
TSH SERPL-ACNC: 2.96 MIU/L (ref 0.44–3.98)
UROBILINOGEN UR STRIP.AUTO-MCNC: ABNORMAL MG/DL
WBC # BLD AUTO: 4.5 X10*3/UL (ref 4.4–11.3)
WBC #/AREA URNS AUTO: ABNORMAL /HPF

## 2025-03-22 PROCEDURE — 99285 EMERGENCY DEPT VISIT HI MDM: CPT | Mod: 25

## 2025-03-22 PROCEDURE — 36415 COLL VENOUS BLD VENIPUNCTURE: CPT | Performed by: PHYSICIAN ASSISTANT

## 2025-03-22 PROCEDURE — 84443 ASSAY THYROID STIM HORMONE: CPT | Performed by: PHYSICIAN ASSISTANT

## 2025-03-22 PROCEDURE — 2500000001 HC RX 250 WO HCPCS SELF ADMINISTERED DRUGS (ALT 637 FOR MEDICARE OP): Performed by: PHYSICIAN ASSISTANT

## 2025-03-22 PROCEDURE — 71046 X-RAY EXAM CHEST 2 VIEWS: CPT | Performed by: RADIOLOGY

## 2025-03-22 PROCEDURE — 70450 CT HEAD/BRAIN W/O DYE: CPT | Performed by: RADIOLOGY

## 2025-03-22 PROCEDURE — 84100 ASSAY OF PHOSPHORUS: CPT | Performed by: PHYSICIAN ASSISTANT

## 2025-03-22 PROCEDURE — 71046 X-RAY EXAM CHEST 2 VIEWS: CPT

## 2025-03-22 PROCEDURE — 93005 ELECTROCARDIOGRAM TRACING: CPT

## 2025-03-22 PROCEDURE — 2500000002 HC RX 250 W HCPCS SELF ADMINISTERED DRUGS (ALT 637 FOR MEDICARE OP, ALT 636 FOR OP/ED): Mod: MUE | Performed by: PHYSICIAN ASSISTANT

## 2025-03-22 PROCEDURE — 70450 CT HEAD/BRAIN W/O DYE: CPT

## 2025-03-22 PROCEDURE — 84484 ASSAY OF TROPONIN QUANT: CPT | Performed by: PHYSICIAN ASSISTANT

## 2025-03-22 PROCEDURE — 80053 COMPREHEN METABOLIC PANEL: CPT | Performed by: PHYSICIAN ASSISTANT

## 2025-03-22 PROCEDURE — 87086 URINE CULTURE/COLONY COUNT: CPT | Mod: PORLAB | Performed by: PHYSICIAN ASSISTANT

## 2025-03-22 PROCEDURE — 83735 ASSAY OF MAGNESIUM: CPT | Performed by: PHYSICIAN ASSISTANT

## 2025-03-22 PROCEDURE — 85025 COMPLETE CBC W/AUTO DIFF WBC: CPT | Performed by: PHYSICIAN ASSISTANT

## 2025-03-22 PROCEDURE — 81003 URINALYSIS AUTO W/O SCOPE: CPT | Performed by: PHYSICIAN ASSISTANT

## 2025-03-22 RX ORDER — POTASSIUM CHLORIDE 750 MG/1
40 TABLET, FILM COATED, EXTENDED RELEASE ORAL DAILY
Status: DISCONTINUED | OUTPATIENT
Start: 2025-03-22 | End: 2025-03-22 | Stop reason: HOSPADM

## 2025-03-22 RX ORDER — METOPROLOL SUCCINATE 25 MG/1
25 TABLET, EXTENDED RELEASE ORAL ONCE
Status: COMPLETED | OUTPATIENT
Start: 2025-03-22 | End: 2025-03-22

## 2025-03-22 RX ORDER — POTASSIUM CHLORIDE 750 MG/1
10 TABLET, FILM COATED, EXTENDED RELEASE ORAL DAILY
Qty: 10 TABLET | Refills: 0 | Status: SHIPPED | OUTPATIENT
Start: 2025-03-22 | End: 2025-04-01

## 2025-03-22 RX ADMIN — POTASSIUM CHLORIDE 40 MEQ: 750 TABLET, FILM COATED, EXTENDED RELEASE ORAL at 09:49

## 2025-03-22 RX ADMIN — METOPROLOL SUCCINATE 25 MG: 25 TABLET, EXTENDED RELEASE ORAL at 07:57

## 2025-03-22 ASSESSMENT — LIFESTYLE VARIABLES
HAVE YOU EVER FELT YOU SHOULD CUT DOWN ON YOUR DRINKING: NO
EVER FELT BAD OR GUILTY ABOUT YOUR DRINKING: NO
TOTAL SCORE: 0
EVER HAD A DRINK FIRST THING IN THE MORNING TO STEADY YOUR NERVES TO GET RID OF A HANGOVER: NO
HAVE PEOPLE ANNOYED YOU BY CRITICIZING YOUR DRINKING: NO

## 2025-03-22 ASSESSMENT — PAIN SCALES - GENERAL: PAINLEVEL_OUTOF10: 0 - NO PAIN

## 2025-03-22 ASSESSMENT — COLUMBIA-SUICIDE SEVERITY RATING SCALE - C-SSRS
2. HAVE YOU ACTUALLY HAD ANY THOUGHTS OF KILLING YOURSELF?: NO
6. HAVE YOU EVER DONE ANYTHING, STARTED TO DO ANYTHING, OR PREPARED TO DO ANYTHING TO END YOUR LIFE?: NO
1. IN THE PAST MONTH, HAVE YOU WISHED YOU WERE DEAD OR WISHED YOU COULD GO TO SLEEP AND NOT WAKE UP?: NO

## 2025-03-22 ASSESSMENT — PAIN - FUNCTIONAL ASSESSMENT: PAIN_FUNCTIONAL_ASSESSMENT: 0-10

## 2025-03-22 NOTE — ED PROVIDER NOTES
HPI   Chief Complaint   Patient presents with    Hypertension     Per EMS pt awoke felling flushed and hands trembling and having high BP, pt has hx of stroke in 2017 and TIA 2023, pt denies CP, SOB, Blurred vision or headache. Pt not on thinners.       History of present illness:  80 yo female present with tremors that started this morning. Pt has a pmh of strokes, TIA and hyperlipidemia.  Pt reports no history of tremors or seizures in the past. Pt reports tremors for 1 hour and called EMS.  Said that it was shaking in her upper and lower extremities without complete contractions.  Tremors are currently gone.  She has not had issues like this in the past.  Denies any increasing stressors.  Pt denies any head injuries. Pt takes metoprolol for htn but denies any new medication use or changes to her daily routine. She denies any HA, vision changes, dizziness chest pain, cough or numbness or tingling.  She denies any fever, chill, recent sickness.     Review of systems: Constitutional, eye, ENT, cardiovascular, respiratory, gastrointestinal, genitourinary, neurologic, musculoskeletal, dermatologic, hematologic, endocrine systems were evaluated and were negative unless otherwise specified in history of present illness.    Medications: Reviewed and per nursing note.    Family history: Denies relevant medical conditions.    Social history: Denies tobacco, alcohol, drug use.      Physical exam:    Appearance: Well-developed, well-nourished, nontoxic-appearing, elderly  female alert and oriented x3. Talking in complete sentences.    HEENT:  Head normocephalic atraumatic, extraocular movements intact, pupils equal round reactive to light, mucous membranes are moist and pink.    NECK:  Nml Inspection, no meningismus, no thyromegaly, no lymphadenopathy, no JVD, trachea is midline.    Respiratory: Clear to auscultation bilaterally with normal bilateral excursion. No wheezes, rhonchi, crackles.    Cardiovascular:  Regular rate and rhythm, no murmurs, rubs or gallops. Pulses 2+ symmetrically in the dorsalis pedis and radial pulses.    Abdomen/GI:  Soft, nontender, nondistended, normal bowel sounds x4. No masses or organomegaly.    :  No CVA tenderness    Neuro:  Oriented x 3, Speech Clear, cranial nerves grossly intact. Normal sensation to light touch in all 4 extremities.    Musculoskeletal: Patient spontaneously moves all 4 extremities.    Skin:  No cyanosis, clubbing, edema, open wounds, or rashes.            Patient History   Past Medical History:   Diagnosis Date    Cataract     HL (hearing loss)     Osteoporosis     Stroke (Multi)     Visual impairment      Past Surgical History:   Procedure Laterality Date    ADENOIDECTOMY       SECTION, LOW TRANSVERSE      EYE SURGERY      HERNIA REPAIR      TONSILLECTOMY      TUBAL LIGATION      WISDOM TOOTH EXTRACTION       Family History   Problem Relation Name Age of Onset    Hypertension Mother      Hypertension Father      Colon cancer Mother's Brother      Stroke Maternal Grandmother       Social History     Tobacco Use    Smoking status: Never     Passive exposure: Past    Smokeless tobacco: Never   Vaping Use    Vaping status: Never Used   Substance Use Topics    Alcohol use: Yes     Alcohol/week: 2.0 standard drinks of alcohol     Types: 1 Cans of beer, 1 Standard drinks or equivalent per week     Comment: very rare    Drug use: Never       Physical Exam   ED Triage Vitals [25 0525]   Temperature Heart Rate Respirations BP   36.6 °C (97.9 °F) 76 (!) 25 (!) 185/84      Pulse Ox Temp Source Heart Rate Source Patient Position   100 % Temporal Monitor Lying      BP Location FiO2 (%)     Left arm --       Physical Exam      ED Course & [facility]   ED Course as of 25 1023   Sat Mar 22, 2025   0938 EKG: Sinus rhythm at rate of 66 bpm with no ST segment elevation or ectopy.  There is baseline wander, low quality EKG.  T wave inversions seen on anterior lateral leads  which is seen in previous EKG 2023.  There is no clear acute ischemia.  MO interval 152 with a QTc 439.  This is interpreted by myself. [SK]      ED Course User Index  [SK] Norm Maier PA-C         Diagnoses as of 03/22/25 1023   Tremor, unspecified   Primary hypertension   Hypokalemia                 No data recorded     [place] Coma Scale Score: 15 (03/22/25 0526 : Jenny Davison RN)                           Medical Decision Making  Labs Reviewed  COMPREHENSIVE METABOLIC PANEL - Abnormal     Glucose                       94                     Sodium                        141                    Potassium                     3.4 (*)                Chloride                      102                    Bicarbonate                   29                     Anion Gap                     13                     Urea Nitrogen                 17                     Creatinine                    0.74                   eGFR                          82                     Calcium                       9.4                    Albumin                       4.5                    Alkaline Phosphatase          50                     Total Protein                 7.4                    AST                           32                     Bilirubin, Total              0.6                    ALT                           32                  URINALYSIS WITH REFLEX CULTURE AND MICROSCOPIC - Abnormal     Color, Urine                  Yellow                 Appearance, Urine             Cloudy (*)               Specific Gravity, Urine       1.020                  pH, Urine                     8.0                    Protein, Urine                NEGATIVE                Glucose, Urine                NEGATIVE                Blood, Urine                  NEGATIVE                Ketones, Urine                NEGATIVE                Bilirubin, Urine              NEGATIVE                Urobilinogen, Urine           NORM                    Nitrite, Urine                NEGATIVE                Leukocyte Esterase, Urine     250 Facundo/uL (*)            MICROSCOPIC ONLY, URINE - Abnormal     WBC, Urine                    1-5                    RBC, Urine                    NONE                   Squamous Epithelial Cells, Urine                          Bacteria, Urine               1+ (*)                 Urinalysis Microscopic Comment                       MAGNESIUM - Normal     Magnesium                     2.03                PHOSPHORUS - Normal     Phosphorus                    3.4                 TSH WITH REFLEX TO FREE T4 IF ABNORMAL - Normal     Thyroid Stimulating Hormone   2.96                       Narrative: TSH testing is performed using different testing methodology at [facility] than at other [facility]. Direct result comparisons should only be made within the same method.                    SERIAL TROPONIN-INITIAL - Normal     Troponin I, High Sensitivity   11                         Narrative: Less than 99th percentile of normal range cutoff-                  Female and children under 18 years old <14 ng/L; Male <21 ng/L: Negative                  Repeat testing should be performed if clinically indicated.                                     Female and children under 18 years old 14-50 ng/L; Male 21-50 ng/L:                  Consistent with possible cardiac damage and possible increased clinical                   risk. Serial measurements may help to assess extent of myocardial damage.                                     >50 ng/L: Consistent with cardiac damage, increased clinical risk and                  myocardial infarction. Serial measurements may help assess extent of                   myocardial damage.                                      NOTE: Children less than 1 year old may have higher baseline troponin                   levels and results should be interpreted in conjunction with the overall                    clinical context.                                     NOTE: Troponin I testing is performed using a different                   testing methodology at [facility] than at other                   [facility]. Direct result comparisons should only                   be made within the same method.  SERIAL TROPONIN, 1 HOUR - Normal     Troponin I, High Sensitivity   12                         Narrative: Less than 99th percentile of normal range cutoff-                  Female and children under 18 years old <14 ng/L; Male <21 ng/L: Negative                  Repeat testing should be performed if clinically indicated.                                     Female and children under 18 years old 14-50 ng/L; Male 21-50 ng/L:                  Consistent with possible cardiac damage and possible increased clinical                   risk. Serial measurements may help to assess extent of myocardial damage.                                     >50 ng/L: Consistent with cardiac damage, increased clinical risk and                  myocardial infarction. Serial measurements may help assess extent of                   myocardial damage.                                      NOTE: Children less than 1 year old may have higher baseline troponin                   levels and results should be interpreted in conjunction with the overall                   clinical context.                                     NOTE: Troponin I testing is performed using a different                   testing methodology at [facility] than at other                   [facility]. Direct result comparisons should only                   be made within the same method.  URINE CULTURE  CBC WITH AUTO DIFFERENTIAL     WBC                           4.5                    nRBC                          0.0                    RBC                           4.65                   Hemoglobin                    14.9                    Hematocrit                    45.0                   MCV                           97                     MCH                           32.0                   MCHC                          33.1                   RDW                           12.8                   Platelets                     238                    Neutrophils %                 42.0                   Immature Granulocytes %, Automated   0.2                    Lymphocytes %                 46.3                   Monocytes %                   7.9                    Eosinophils %                 2.9                    Basophils %                   0.7                    Neutrophils Absolute          1.87                   Immature Granulocytes Absolute, Au*   0.01                   Lymphocytes Absolute          2.06                   Monocytes Absolute            0.35                   Eosinophils Absolute          0.13                   Basophils Absolute            0.03                TROPONIN SERIES- (INITIAL, 1 HR)         Narrative: The following orders were created for panel order Troponin I Series, High Sensitivity (0, 1 HR).                  Procedure                               Abnormality         Status                                     ---------                               -----------         ------                                     Troponin I, High Sensiti...[[id number]]  Normal              Final result                               Troponin, High Sensitivi...[[id number]]  Normal              Final result                                                 Please view results for these tests on the individual orders.  URINALYSIS WITH REFLEX CULTURE AND MICROSCOPIC         Narrative: The following orders were created for panel order Urinalysis with Reflex Culture and Microscopic.                  Procedure                               Abnormality         Status                                     ---------                                -----------         ------                                     Urinalysis with Reflex C...[[id number]]  Abnormal            Final result                               Extra Urine Gray Tube[[id number]]                            In process                                                   Please view results for these tests on the individual orders.  EXTRA URINE GRAY TUBE    CT head wo IV contrast   Final Result    Mild age related degenerative change as described without acute    findings or significant change from the prior exam.          Signed by: Gopal Yoon 3/22/2025 8:27 AM    Dictation workstation:   [id number]     XR chest 2 views   Final Result    1.  No active cardiopulmonary disease.  There has not been    significant interval change from the prior exam.          Signed by: Gopal Yoon 3/22/2025 8:14 AM    Dictation workstation:   [id number]         Patient presents for tremors this morning.  Differential diagnosis of typical seizure, atypical seizure, thyrotoxicosis, electrolyte disturbance, arrhythmia, hypertensive urgency or emergency, intracranial hemorrhage, stroke, brain mass, coronary syndrome, anxiety, panic attack.  Examination is normal.  Initial blood pressure elevated at 185/84 with a heart rate of 76.  She has no additional symptoms.  Her tremors are gone at this time.    EKG CBC CMP troponin chest x-ray urinalysis CT head ordered.  Given her normal daily dose of metoprolol.    Diagnostic studies show normal troponin, urinalysis with 1+ bacteria and squamous cells with no clear UTI symptoms, UTI less likely, TSH normal making thyrotoxicosis unlikely, chemistry with potassium 3.4 magnesium phosphorus normal CBC with differential normal.  CT head shows degenerative changes with no acute findings.  Chest x-ray unremarkable.  Patient given potassium supplementation.  Hypokalemia could be playing a role in some tremor and muscle cramping.  Etiology not entirely clear otherwise.  Patient was  observed for 5 hours in the emergency department and had no additional episodes.  She will be discharged to home with potassium supplement for 10 days.    Patient will be discharged to home with prescription.  Patient is educated in signs and symptoms of worsening symptoms and reasons to come back to the emergency department.  Will need to follow up with primary care provider and her neurologist.  Patient does not report social determinants of health impacting ability to obtain care that is needed.  Patient agrees with plan.    This is a transcription.  Text was reviewed for errors, but some transcription errors may remain.  Please call for any questions.            Procedure  Procedures     Norm Maier PA-C  03/22/25 1025

## 2025-03-23 LAB — BACTERIA UR CULT: NORMAL

## 2025-03-24 ENCOUNTER — TELEPHONE (OUTPATIENT)
Dept: PRIMARY CARE | Facility: CLINIC | Age: 80
End: 2025-03-24
Payer: MEDICARE

## 2025-03-24 ENCOUNTER — TELEPHONE (OUTPATIENT)
Dept: NEUROLOGY | Facility: CLINIC | Age: 80
End: 2025-03-24
Payer: MEDICARE

## 2025-03-24 LAB
ATRIAL RATE: 66 BPM
P AXIS: 79 DEGREES
PR INTERVAL: 152 MS
Q ONSET: 252 MS
QRS COUNT: 10 BEATS
QRS DURATION: 112 MS
QT INTERVAL: 419 MS
QTC CALCULATION(BAZETT): 439 MS
QTC FREDERICIA: 432 MS
R AXIS: 64 DEGREES
T AXIS: 188 DEGREES
T OFFSET: 462 MS
VENTRICULAR RATE: 66 BPM

## 2025-03-24 NOTE — TELEPHONE ENCOUNTER
Pt called to state was seen in the ER yesterday ( record in chart) woke up Saturday trembling, and shaking, called EMS and per pt every thing check out but given her past history of a stroke and TIA's decided best to go to the ER.    Per pt after all the testing was determined that she had a low Potassium level and got 4 potassium tablets in the ER and was sent home with a prescription to take for the next 10 days   Per pt has not had anymore trembling    Pt wondering do you want her to follow up here in the office.    Please review and advise

## 2025-03-24 NOTE — TELEPHONE ENCOUNTER
Patient woke up around 4am on Saturday morning and called the squad as she was trembling and shaking however was not cold. They took her to the hospital and ran blood work, urine and a ct scan and everything was normal. They gave her potassium pills to take and was wondering if this was ok to take? She is aware that you are out of town and will not answer until Monday 3/31/25. Please advise

## 2025-03-25 NOTE — TELEPHONE ENCOUNTER
Pt verbalized understands response message from Dr Armstrong, per pt she does have a call into her neurologist

## 2025-04-01 ENCOUNTER — TELEPHONE (OUTPATIENT)
Dept: PRIMARY CARE | Facility: CLINIC | Age: 80
End: 2025-04-01
Payer: MEDICARE

## 2025-04-01 DIAGNOSIS — E87.6 HYPOKALEMIA: Primary | ICD-10-CM

## 2025-04-01 NOTE — TELEPHONE ENCOUNTER
Order placed with non triage pool cc'd results.    Pt called to state that she got a prescription for 10 days of Potassium Chloride 10 meq 1 daily because that is all they found on her recent ER visit for the shaking, pt wondering should she have her potassium level recheck to see if she should continue with the potassium or if her level is back to normal.    Please review and advise

## 2025-04-04 LAB
ANION GAP SERPL CALCULATED.4IONS-SCNC: 7 MMOL/L (CALC) (ref 7–17)
BUN SERPL-MCNC: 17 MG/DL (ref 7–25)
BUN/CREAT SERPL: NORMAL (CALC) (ref 6–22)
CALCIUM SERPL-MCNC: 9.7 MG/DL (ref 8.6–10.4)
CHLORIDE SERPL-SCNC: 104 MMOL/L (ref 98–110)
CO2 SERPL-SCNC: 31 MMOL/L (ref 20–32)
CREAT SERPL-MCNC: 0.74 MG/DL (ref 0.6–0.95)
EGFRCR SERPLBLD CKD-EPI 2021: 82 ML/MIN/1.73M2
GLUCOSE SERPL-MCNC: 74 MG/DL (ref 65–139)
POTASSIUM SERPL-SCNC: 4.3 MMOL/L (ref 3.5–5.3)
SODIUM SERPL-SCNC: 142 MMOL/L (ref 135–146)

## 2025-04-11 ENCOUNTER — APPOINTMENT (OUTPATIENT)
Dept: NEUROLOGY | Facility: CLINIC | Age: 80
End: 2025-04-11
Payer: MEDICARE

## 2025-04-11 DIAGNOSIS — R25.1 EPISODE OF SHAKING: Primary | ICD-10-CM

## 2025-04-11 DIAGNOSIS — I10 PRIMARY HYPERTENSION: ICD-10-CM

## 2025-04-11 DIAGNOSIS — R25.1 TREMOR, UNSPECIFIED: ICD-10-CM

## 2025-04-11 DIAGNOSIS — E87.6 HYPOKALEMIA: ICD-10-CM

## 2025-04-11 PROCEDURE — 1159F MED LIST DOCD IN RCRD: CPT | Performed by: PSYCHIATRY & NEUROLOGY

## 2025-04-11 PROCEDURE — G2211 COMPLEX E/M VISIT ADD ON: HCPCS | Performed by: PSYCHIATRY & NEUROLOGY

## 2025-04-11 PROCEDURE — 99213 OFFICE O/P EST LOW 20 MIN: CPT | Performed by: PSYCHIATRY & NEUROLOGY

## 2025-04-11 PROCEDURE — 1160F RVW MEDS BY RX/DR IN RCRD: CPT | Performed by: PSYCHIATRY & NEUROLOGY

## 2025-04-11 PROCEDURE — 1157F ADVNC CARE PLAN IN RCRD: CPT | Performed by: PSYCHIATRY & NEUROLOGY

## 2025-04-11 PROCEDURE — 1123F ACP DISCUSS/DSCN MKR DOCD: CPT | Performed by: PSYCHIATRY & NEUROLOGY

## 2025-04-11 PROCEDURE — 1036F TOBACCO NON-USER: CPT | Performed by: PSYCHIATRY & NEUROLOGY

## 2025-04-11 RX ORDER — LEVETIRACETAM 500 MG/1
500 TABLET ORAL 2 TIMES DAILY
Qty: 90 TABLET | Refills: 3 | Status: SHIPPED | OUTPATIENT
Start: 2025-04-11 | End: 2026-04-11

## 2025-04-11 ASSESSMENT — ENCOUNTER SYMPTOMS
LOSS OF SENSATION IN FEET: 0
HALLUCINATIONS: 0
DEPRESSION: 0
EYE PAIN: 0
HEADACHES: 0
DIZZINESS: 1
OCCASIONAL FEELINGS OF UNSTEADINESS: 0
FEVER: 0

## 2025-04-11 NOTE — PATIENT INSTRUCTIONS
I want you to get an EEG.   Please schedule this for yourself by calling [phone].    This will help evaluate how the brain waves are functioning.    I will call you if there is anything abnormal.     Start Keppra 500mg twice daily.    Follow up in 4 months.

## 2025-04-11 NOTE — PROGRESS NOTES
Subjective     Deena Pop is a 79 y.o. year old female here for dizziness/ TIA follow up. MRI brain results review.     Virtual or Telephone Consent    An interactive audio and video telecommunication system which permits real time communications between the patient (at the originating site) and provider (at the distant site) was utilized to provide this telehealth service.   Verbal consent was requested and obtained from Deena Pop on this date, 04/11/25 for a telehealth visit and the patient's location was confirmed at the time of the visit.    HPI     MRI Brain images personally reviewed.  She has chronic vessel disease in b/l hemispheres, parietal lobe, frontal, cerebellar b/l.     She was in the ER March 22 due to shaking episode.   She woke up with her whole body shaking, kind of like chills but much more intense.  She got nervous and called 911 and lasted about 1 hour. She felt normal after the tremors were gone.   Few days later she had another episode from her sleep woke her up. She had 5 total episodes always during sleep, no LOC, last one was April 7. No tongue biting. No urinary incontinence.   No confusion after.   Her vertigo is improved.   Hx of SVT, had stroke years ago in 2017 had cerebellar stroke (  [place]) the MRA showed the atherosclerotic disease.   She is on ASA 81mg daily and lipitor 40mg daily.   Carotid US and cT head were unremarkable.  LDL 49.   Heart monitor showed no AFIB.   FH: father had enlarged heart, her mother broke her hip in 70s.   A1c 5.7.    Review of Systems   Constitutional:  Negative for fever.   HENT:  Negative for ear pain.    Eyes:  Negative for pain.   Neurological:  Positive for dizziness. Negative for syncope and headaches.   Psychiatric/Behavioral:  Negative for hallucinations and self-injury.    All other systems reviewed and are negative.      Patient Active Problem List   Diagnosis    Hyperlipidemia    Osteoporosis    HTN (hypertension), benign     Shingles    Exudative age-related macular degeneration, right eye, with active choroidal neovascularization    Supraventricular tachycardia     Past Medical History:   Diagnosis Date    Cataract     HL (hearing loss)     Osteoporosis     Stroke (Multi)     Visual impairment      Past Surgical History:   Procedure Laterality Date    ADENOIDECTOMY       SECTION, LOW TRANSVERSE      EYE SURGERY      HERNIA REPAIR      TONSILLECTOMY      TUBAL LIGATION      WISDOM TOOTH EXTRACTION       Social History     Tobacco Use    Smoking status: Never     Passive exposure: Past    Smokeless tobacco: Never   Substance Use Topics    Alcohol use: Yes     Alcohol/week: 2.0 standard drinks of alcohol     Types: 1 Cans of beer, 1 Standard drinks or equivalent per week     Comment: very rare     family history includes Colon cancer in her mother's brother; Hypertension in her father and mother; Stroke in her maternal grandmother.    Current Outpatient Medications:     alendronate (Fosamax) 70 mg tablet, Take 1 tablet (70 mg) by mouth every 7 days. Take in the morning with a full glass of water, on an empty stomach, and do not take anything else by mouth or lie down for the next 30 min., Disp: 12 tablet, Rfl: 3    aspirin 81 mg EC tablet, Take 1 tablet (81 mg) by mouth once daily., Disp: , Rfl:     atorvastatin (Lipitor) 40 mg tablet, Take 1 tablet (40 mg) by mouth once daily. At bedtime, Disp: 90 tablet, Rfl: 3    b complex vitamins (Vitamins B Complex) capsule, Take 1 capsule by mouth once daily., Disp: , Rfl:     calcium carbonate 600 mg calcium (1,500 mg) tablet, Take 1 tablet (1,500 mg) by mouth once daily., Disp: , Rfl:     cholecalciferol (Vitamin D-3) 125 MCG (5000 UT) capsule, Take 1 capsule (5,000 Units) by mouth once daily., Disp: , Rfl:     diphenhydrAMINE (BenadryL) 25 mg capsule, Take 1 capsule (25 mg) by mouth as needed at bedtime for itching., Disp: , Rfl:     famotidine (Pepcid) 20 mg tablet, Take 1 tablet (20  mg) by mouth as needed at bedtime for indigestion., Disp: , Rfl:     metoprolol succinate XL (Toprol-XL) 25 mg 24 hr tablet, TAKE 1 TABLET DAILY (DO NOT CRUSH OR CHEW), Disp: 90 tablet, Rfl: 1  Allergies   Allergen Reactions    Sulfa (Sulfonamide Antibiotics) Unknown     There were no vitals taken for this visit.  Neurological Exam/Physical Exam:  alert, oriented. face symmetric. speech is clear, fluent.  appropriate, conversational.   moves all extremities above gravity.   no tremor or abnormal movements seen.       Labs:  CBC:   Lab Results   Component Value Date    WBC 4.5 03/22/2025    HGB 14.9 03/22/2025    HCT 45.0 03/22/2025     03/22/2025     BMP:   Lab Results   Component Value Date     04/03/2025    K 4.3 04/03/2025     04/03/2025    CO2 31 04/03/2025    BUN 17 04/03/2025    CREATININE 0.74 04/03/2025    CALCIUM 9.7 04/03/2025    MG 2.03 03/22/2025    PHOS 3.4 03/22/2025     LFT:   Lab Results   Component Value Date    ALKPHOS 50 03/22/2025    BILITOT 0.6 03/22/2025    BILIDIR 0.2 11/10/2023    PROT 7.4 03/22/2025    ALBUMIN 4.5 03/22/2025    ALT 32 03/22/2025    AST 32 03/22/2025     MRI brain shows diffuse volume loss and chronic small vessel disease. Prior unchanged cavernous angioma in L temporal lobe.  B12 and TSH WNL.     Assessment/Plan   Problem List Items Addressed This Visit    None  Visit Diagnoses         Codes    Tremor, unspecified     R25.1    Primary hypertension     I10    Hypokalemia     E87.6        New shaking episodes during sleep.   would start Keppra 500mg BID for shaking episodes. Unclear if seizure.   Obtain EEG.  Continue aspirin and lipitor.       Total time with patient: 22 minutes.

## 2025-04-21 ENCOUNTER — APPOINTMENT (OUTPATIENT)
Dept: PODIATRY | Facility: CLINIC | Age: 80
End: 2025-04-21
Payer: MEDICARE

## 2025-04-21 DIAGNOSIS — M79.675 TOE PAIN, LEFT: ICD-10-CM

## 2025-04-21 DIAGNOSIS — B35.1 TINEA UNGUIUM: Primary | ICD-10-CM

## 2025-04-21 DIAGNOSIS — M79.674 TOE PAIN, RIGHT: ICD-10-CM

## 2025-04-21 PROCEDURE — 1036F TOBACCO NON-USER: CPT | Performed by: PODIATRIST

## 2025-04-21 PROCEDURE — 1123F ACP DISCUSS/DSCN MKR DOCD: CPT | Performed by: PODIATRIST

## 2025-04-21 PROCEDURE — 1157F ADVNC CARE PLAN IN RCRD: CPT | Performed by: PODIATRIST

## 2025-04-21 PROCEDURE — 1160F RVW MEDS BY RX/DR IN RCRD: CPT | Performed by: PODIATRIST

## 2025-04-21 PROCEDURE — 1159F MED LIST DOCD IN RCRD: CPT | Performed by: PODIATRIST

## 2025-04-21 PROCEDURE — 11721 DEBRIDE NAIL 6 OR MORE: CPT | Performed by: PODIATRIST

## 2025-04-21 NOTE — PROGRESS NOTES
CC:  painful thickened and elongated toenails, orthotics.     HPI:  Pt presents complaining painful thickened and elongated toenails that are difficult to manage.  Onset was gradual with worsening course until recently.  Aggravated by shoe gear and ambulation. Had some seuzures, see's neuro.        PCP: Dr. Armstrong  Last visit: 11-11-24     PMH  Medical History           Past Medical History:   Diagnosis Date    Cataract      HL (hearing loss)      Osteoporosis      Stroke (CMS/HCC)      Visual impairment           MEDS     Current Outpatient Medications:     aspirin 81 mg EC tablet, Take 1 tablet (81 mg) by mouth once daily., Disp: , Rfl:     atorvastatin (Lipitor) 40 mg tablet, Take 1 tablet (40 mg) by mouth once daily. At bedtime, Disp: 90 tablet, Rfl: 1    b complex vitamins (Vitamins B Complex) capsule, Take 1 capsule by mouth once daily., Disp: , Rfl:     calcium carbonate 600 mg calcium (1,500 mg) tablet, Take 1 tablet (1,500 mg) by mouth once daily., Disp: , Rfl:     cholecalciferol (Vitamin D-3) 125 MCG (5000 UT) capsule, Take 1 capsule (125 mcg) by mouth once daily., Disp: , Rfl:     famotidine (Pepcid) 20 mg tablet, Take 1 tablet (20 mg) by mouth as needed at bedtime for indigestion., Disp: , Rfl:     metoprolol succinate XL (Toprol-XL) 25 mg 24 hr tablet, TAKE 1 TABLET DAILY (DO NOT CRUSH OR CHEW), Disp: 90 tablet, Rfl: 1  Allergies          Allergies   Allergen Reactions    Sulfa (Sulfonamide Antibiotics) Unknown      Social History   Social History                Socioeconomic History    Marital status:        Spouse name: None    Number of children: None    Years of education: None    Highest education level: None   Occupational History    None   Tobacco Use    Smoking status: Never       Passive exposure: Past    Smokeless tobacco: Never   Vaping Use    Vaping Use: Never used   Substance and Sexual Activity    Alcohol use: Yes       Alcohol/week: 2.0 standard drinks of alcohol       Types: 1  Cans of beer, 1 Standard drinks or equivalent per week       Comment: very rare    Drug use: Never    Sexual activity: None   Other Topics Concern    None   Social History Narrative    None      Social Determinants of Health      Financial Resource Strain: Not on file   Food Insecurity: Not on file   Transportation Needs: Not on file   Physical Activity: Not on file   Stress: Not on file   Social Connections: Not on file   Intimate Partner Violence: Not on file   Housing Stability: Not on file         Family History               Family History   Problem Relation Name Age of Onset    Hypertension Mother        Hypertension Father        Colon cancer Mother's Brother        Stroke Maternal Grandmother             Surgical History             Past Surgical History:   Procedure Laterality Date    ADENOIDECTOMY         SECTION, LOW TRANSVERSE        EYE SURGERY        HERNIA REPAIR        TONSILLECTOMY        TUBAL LIGATION        WISDOM TOOTH EXTRACTION                REVIEW OF SYSTEMS     DERM:   + as noted in HPI.         Physical examination:   On General Observation: Patient is a pleasant, cooperative, well developed 78 y.o.  adult female. The patient is alert and oriented to time, place and person. Patient has normal affect and mood.  There were no vitals taken for this visit.     Vascular:  DP and PT pulses are 2/4 b/l.  mild edema noted. mild varicosities b/l.  CFT  6 seconds to all digits bilateral.  Skin temperature is warm to warm from proximal to distal bilateral.       Muscular: Strength is 5/5 for all instrinsic and extrinsic muscle groups.      Neuro:  Proprioception present.   Sensation to vibration is  present. Protective sensation intact  at all pedal sites via Monson Ricky 5.07 monofilament bilateral.  Light touch present bilateral.      Derm:    Decreased hair growth b/l le  Left toenails: 1-5 Brittleness, crumbling upon debridement, subungual debris, elongation, mycotic appearance,  tenderness, and thickness.   Right toenails: 1-5 Brittleness, crumbling upon debridement, subungual debris, elongation, mycotic appearance, tenderness, and thickness.      Ortho:  Prominence is present lateral 5th metatarsal head, loss of the plantar fat pad.     ASSESSMENT:         Tinea Unguium [B35.1]   Pain in right toe(s) [M79.674]   Pain in left toe(s) [M79.675]         PLAN:      - Debrided toenails 1-10 in length and height.   - Follow up in 9-12 weeks.         Piter Xiao DPM

## 2025-04-24 ENCOUNTER — TELEPHONE (OUTPATIENT)
Facility: CLINIC | Age: 80
End: 2025-04-24
Payer: MEDICARE

## 2025-04-24 NOTE — TELEPHONE ENCOUNTER
Patient has not had any episodes ever since she started the new medication you had prescribed. She is having her EEG tomorrow and was wondering if she should take her medicine before the test or wait until after the test?

## 2025-04-25 ENCOUNTER — HOSPITAL ENCOUNTER (OUTPATIENT)
Dept: NEUROLOGY | Facility: HOSPITAL | Age: 80
Discharge: HOME | End: 2025-04-25
Payer: MEDICARE

## 2025-04-25 DIAGNOSIS — R25.1 EPISODE OF SHAKING: ICD-10-CM

## 2025-04-25 PROCEDURE — 95816 EEG AWAKE AND DROWSY: CPT

## 2025-04-25 PROCEDURE — 95816 EEG AWAKE AND DROWSY: CPT | Performed by: PSYCHIATRY & NEUROLOGY

## 2025-05-06 ENCOUNTER — TELEPHONE (OUTPATIENT)
Dept: NEUROLOGY | Facility: CLINIC | Age: 80
End: 2025-05-06

## 2025-05-06 ENCOUNTER — APPOINTMENT (OUTPATIENT)
Dept: PRIMARY CARE | Facility: CLINIC | Age: 80
End: 2025-05-06
Payer: MEDICARE

## 2025-05-06 VITALS
HEIGHT: 65 IN | WEIGHT: 123.6 LBS | HEART RATE: 73 BPM | OXYGEN SATURATION: 96 % | SYSTOLIC BLOOD PRESSURE: 120 MMHG | BODY MASS INDEX: 20.59 KG/M2 | TEMPERATURE: 97.4 F | DIASTOLIC BLOOD PRESSURE: 70 MMHG

## 2025-05-06 DIAGNOSIS — H35.3211 EXUDATIVE AGE-RELATED MACULAR DEGENERATION, RIGHT EYE, WITH ACTIVE CHOROIDAL NEOVASCULARIZATION: ICD-10-CM

## 2025-05-06 DIAGNOSIS — I10 HTN (HYPERTENSION), BENIGN: ICD-10-CM

## 2025-05-06 DIAGNOSIS — R25.1 TREMOR, UNSPECIFIED: ICD-10-CM

## 2025-05-06 DIAGNOSIS — I10 PRIMARY HYPERTENSION: ICD-10-CM

## 2025-05-06 DIAGNOSIS — H35.3221 EXUDATIVE AGE-RELATED MACULAR DEGENERATION, LEFT EYE, WITH ACTIVE CHOROIDAL NEOVASCULARIZATION: ICD-10-CM

## 2025-05-06 DIAGNOSIS — Z13.6 ENCOUNTER FOR SCREENING FOR CORONARY ARTERY DISEASE: Primary | ICD-10-CM

## 2025-05-06 DIAGNOSIS — E55.9 VITAMIN D DEFICIENCY: ICD-10-CM

## 2025-05-06 DIAGNOSIS — E87.6 HYPOKALEMIA: ICD-10-CM

## 2025-05-06 PROCEDURE — 1159F MED LIST DOCD IN RCRD: CPT | Performed by: STUDENT IN AN ORGANIZED HEALTH CARE EDUCATION/TRAINING PROGRAM

## 2025-05-06 PROCEDURE — 3074F SYST BP LT 130 MM HG: CPT | Performed by: STUDENT IN AN ORGANIZED HEALTH CARE EDUCATION/TRAINING PROGRAM

## 2025-05-06 PROCEDURE — 1160F RVW MEDS BY RX/DR IN RCRD: CPT | Performed by: STUDENT IN AN ORGANIZED HEALTH CARE EDUCATION/TRAINING PROGRAM

## 2025-05-06 PROCEDURE — 99214 OFFICE O/P EST MOD 30 MIN: CPT | Performed by: STUDENT IN AN ORGANIZED HEALTH CARE EDUCATION/TRAINING PROGRAM

## 2025-05-06 PROCEDURE — G2211 COMPLEX E/M VISIT ADD ON: HCPCS | Performed by: STUDENT IN AN ORGANIZED HEALTH CARE EDUCATION/TRAINING PROGRAM

## 2025-05-06 PROCEDURE — 1036F TOBACCO NON-USER: CPT | Performed by: STUDENT IN AN ORGANIZED HEALTH CARE EDUCATION/TRAINING PROGRAM

## 2025-05-06 PROCEDURE — 3078F DIAST BP <80 MM HG: CPT | Performed by: STUDENT IN AN ORGANIZED HEALTH CARE EDUCATION/TRAINING PROGRAM

## 2025-05-06 RX ORDER — METOPROLOL SUCCINATE 25 MG/1
TABLET, EXTENDED RELEASE ORAL
Qty: 90 TABLET | Refills: 1 | Status: SHIPPED | OUTPATIENT
Start: 2025-05-06

## 2025-05-06 ASSESSMENT — ENCOUNTER SYMPTOMS
WHEEZING: 0
SHORTNESS OF BREATH: 0
FATIGUE: 0
HEMATURIA: 0
ABDOMINAL PAIN: 0
DEPRESSION: 0
DIZZINESS: 0
DIARRHEA: 0
PALPITATIONS: 0
NERVOUS/ANXIOUS: 0
DYSPHORIC MOOD: 0
NUMBNESS: 0
DYSURIA: 0
HEADACHES: 0
CHILLS: 0
FREQUENCY: 0
CONSTIPATION: 0
OCCASIONAL FEELINGS OF UNSTEADINESS: 0
FEVER: 0
COUGH: 0
NAUSEA: 0

## 2025-05-06 NOTE — PROGRESS NOTES
"Subjective   Patient ID: Deena Pop is a 79 y.o. female who presents for Follow-up (Blood pressure, review neurology visit).    HPI     Follow-up of  hypertension. Current medications Beta-Blocker. Home blood pressure readings:  Not checking regularly, 110s-120s/60s. Medication compliance: taking as prescribed.     In March she went to the emergency department for her tremor. ED had no idea what was happening. By the time she got to the ED she was feeling fine. She has a past medical history of strokes, TIA.  No history of tremor or seizures. She went after the 2nd time it happened. Has already seen neurology and they ordered an EEG.  They started her on Keppra and want to see her back in August.  It appears she had an EEG that was normal, hasn't heard from neuro.  Has been feeling ok since starting keppra.     Her back is still bothering her. She needs another HEP, she lost them and didn't get a chance to do them. It doesn't bother her normally, only if she is standing still for a length of time. No n/t. Mid-low back. Known OA.     Review of Systems   Constitutional:  Negative for chills, fatigue and fever.   Respiratory:  Negative for cough, shortness of breath and wheezing.    Cardiovascular:  Negative for chest pain, palpitations and leg swelling.   Gastrointestinal:  Negative for abdominal pain, constipation, diarrhea and nausea.   Genitourinary:  Negative for dysuria, frequency, hematuria and urgency.   Neurological:  Negative for dizziness, numbness and headaches.   Psychiatric/Behavioral:  Negative for dysphoric mood. The patient is not nervous/anxious.        Objective   /70 (BP Location: Left arm, Patient Position: Sitting, BP Cuff Size: Adult)   Pulse 73   Temp 36.3 °C (97.4 °F) (Temporal)   Ht 1.65 m (5' 4.96\")   Wt 56.1 kg (123 lb 9.6 oz)   SpO2 96%   BMI 20.59 kg/m²     Physical Exam  Constitutional:       Appearance: Normal appearance.   HENT:      Head: Normocephalic and atraumatic. "   Eyes:      Extraocular Movements: Extraocular movements intact.      Pupils: Pupils are equal, round, and reactive to light.   Cardiovascular:      Rate and Rhythm: Normal rate and regular rhythm.      Heart sounds: Normal heart sounds. No murmur heard.  Pulmonary:      Effort: Pulmonary effort is normal.      Breath sounds: Normal breath sounds. No wheezing.   Musculoskeletal:         General: Normal range of motion.   Skin:     General: Skin is warm and dry.   Neurological:      General: No focal deficit present.      Mental Status: She is alert and oriented to person, place, and time.   Psychiatric:         Mood and Affect: Mood normal.         Behavior: Behavior normal.         Assessment/Plan   Problem List Items Addressed This Visit       HTN (hypertension), benign    Relevant Medications    metoprolol succinate XL (Toprol-XL) 25 mg 24 hr tablet    Other Relevant Orders    Lipid Panel    Comprehensive Metabolic Panel    CBC    Exudative age-related macular degeneration, right eye, with active choroidal neovascularization     Other Visit Diagnoses         Encounter for screening for coronary artery disease    -  Primary    Relevant Orders    Lipid Panel      Tremor, unspecified          Primary hypertension          Hypokalemia          Exudative age-related macular degeneration, left eye, with active choroidal neovascularization          Vitamin D deficiency        Relevant Orders    Vitamin D 25 hydroxy          Following with neurology for tremor/possible seizure disorder.  She is going to call their office to get an update on results and follow-up    Blood pressure well-controlled, will continue current medications.    HEP given    Will see her back in 6 months for her Medicare physical with fasting blood work prior    Radha Armstrong DO  5/6/2025

## 2025-05-06 NOTE — TELEPHONE ENCOUNTER
Patient had her EEG done and would like to know the results and what the next steps are. Would you like me to schedule a virtual with her? Please advise

## 2025-05-09 ENCOUNTER — TELEMEDICINE (OUTPATIENT)
Dept: NEUROLOGY | Facility: CLINIC | Age: 80
End: 2025-05-09
Payer: MEDICARE

## 2025-05-09 VITALS — BODY MASS INDEX: 20.49 KG/M2 | HEIGHT: 65 IN | WEIGHT: 123 LBS

## 2025-05-09 DIAGNOSIS — D18.00 CAVERNOUS ANGIOMA: ICD-10-CM

## 2025-05-09 DIAGNOSIS — R25.1 EPISODE OF SHAKING: Primary | ICD-10-CM

## 2025-05-09 PROCEDURE — 1126F AMNT PAIN NOTED NONE PRSNT: CPT | Performed by: PSYCHIATRY & NEUROLOGY

## 2025-05-09 PROCEDURE — 1036F TOBACCO NON-USER: CPT | Performed by: PSYCHIATRY & NEUROLOGY

## 2025-05-09 PROCEDURE — 1159F MED LIST DOCD IN RCRD: CPT | Performed by: PSYCHIATRY & NEUROLOGY

## 2025-05-09 PROCEDURE — 1160F RVW MEDS BY RX/DR IN RCRD: CPT | Performed by: PSYCHIATRY & NEUROLOGY

## 2025-05-09 PROCEDURE — G2211 COMPLEX E/M VISIT ADD ON: HCPCS | Performed by: PSYCHIATRY & NEUROLOGY

## 2025-05-09 PROCEDURE — 99212 OFFICE O/P EST SF 10 MIN: CPT | Performed by: PSYCHIATRY & NEUROLOGY

## 2025-05-09 PROCEDURE — G8433 SCR FOR DEP NOT CPT DOC RSN: HCPCS | Performed by: PSYCHIATRY & NEUROLOGY

## 2025-05-09 RX ORDER — LEVETIRACETAM 500 MG/1
500 TABLET ORAL 2 TIMES DAILY
Qty: 180 TABLET | Refills: 3 | Status: SHIPPED | OUTPATIENT
Start: 2025-05-09 | End: 2026-05-09

## 2025-05-09 ASSESSMENT — ENCOUNTER SYMPTOMS
OCCASIONAL FEELINGS OF UNSTEADINESS: 0
EYE PAIN: 0
LOSS OF SENSATION IN FEET: 0
HALLUCINATIONS: 0
FEVER: 0
DIZZINESS: 1
HEADACHES: 0
DEPRESSION: 0

## 2025-05-09 ASSESSMENT — PAIN SCALES - GENERAL: PAINLEVEL_OUTOF10: 0-NO PAIN

## 2025-05-09 NOTE — PROGRESS NOTES
Subjective     Deena Pop is a 79 y.o. year old female here for dizziness/ TIA follow up. EEG and med review     Virtual or Telephone Consent    An interactive audio and video telecommunication system which permits real time communications between the patient (at the originating site) and provider (at the distant site) was utilized to provide this telehealth service.   Verbal consent was requested and obtained from Deena Pop on this date, 05/09/25 for a telehealth visit and the patient's location was confirmed at the time of the visit.    HPI   Started Keppra 500mg BID and has not had any shaking episodes anymore.  They were occurring at 3am in her sleep and would wake her up.   EEG was normal.   MRI Brain images personally reviewed.  She has chronic vessel disease in b/l hemispheres, parietal lobe, frontal, cerebellar b/l.     She was in the ER March 22 due to shaking episode.   She woke up with her whole body shaking, kind of like chills but much more intense.  She got nervous and called 911 and lasted about 1 hour. She felt normal after the tremors were gone.   Few days later she had another episode from her sleep woke her up. She had 5 total episodes always during sleep, no LOC, last one was April 7. No tongue biting. No urinary incontinence.   No confusion after.   Her vertigo is improved.   Hx of SVT, had stroke years ago in 2017 had cerebellar stroke (  [place]) the MRA showed the atherosclerotic disease.   She is on ASA 81mg daily and lipitor 40mg daily.   Carotid US and cT head were unremarkable.  LDL 49.   Heart monitor showed no AFIB.   FH: father had enlarged heart, her mother broke her hip in 70s.   A1c 5.7.    Review of Systems   Constitutional:  Negative for fever.   HENT:  Negative for ear pain.    Eyes:  Negative for pain.   Neurological:  Positive for dizziness. Negative for syncope and headaches.   Psychiatric/Behavioral:  Negative for hallucinations and self-injury.    All other  systems reviewed and are negative.      Patient Active Problem List   Diagnosis    Hyperlipidemia    Osteoporosis    HTN (hypertension), benign    Shingles    Exudative age-related macular degeneration, right eye, with active choroidal neovascularization    Supraventricular tachycardia     Past Medical History:   Diagnosis Date    Cataract     HL (hearing loss)     Osteoporosis     Stroke (Multi)     Visual impairment      Past Surgical History:   Procedure Laterality Date    ADENOIDECTOMY       SECTION, LOW TRANSVERSE      EYE SURGERY      HERNIA REPAIR      TONSILLECTOMY      TUBAL LIGATION      WISDOM TOOTH EXTRACTION       Social History     Tobacco Use    Smoking status: Never     Passive exposure: Past    Smokeless tobacco: Never   Substance Use Topics    Alcohol use: Yes     Alcohol/week: 2.0 standard drinks of alcohol     Types: 1 Cans of beer, 1 Standard drinks or equivalent per week     Comment: very rare     family history includes Colon cancer in her mother's brother; Hypertension in her father and mother; Stroke in her maternal grandmother.    Current Outpatient Medications:     alendronate (Fosamax) 70 mg tablet, Take 1 tablet (70 mg) by mouth every 7 days. Take in the morning with a full glass of water, on an empty stomach, and do not take anything else by mouth or lie down for the next 30 min., Disp: 12 tablet, Rfl: 3    aspirin 81 mg EC tablet, Take 1 tablet (81 mg) by mouth once daily., Disp: , Rfl:     atorvastatin (Lipitor) 40 mg tablet, Take 1 tablet (40 mg) by mouth once daily. At bedtime, Disp: 90 tablet, Rfl: 3    b complex vitamins (Vitamins B Complex) capsule, Take 1 capsule by mouth once daily., Disp: , Rfl:     calcium carbonate 600 mg calcium (1,500 mg) tablet, Take 1 tablet (1,500 mg) by mouth once daily., Disp: , Rfl:     cholecalciferol (Vitamin D-3) 125 MCG (5000 UT) capsule, Take 1 capsule (5,000 Units) by mouth once daily., Disp: , Rfl:     diphenhydrAMINE (BenadryL) 25 mg  capsule, Take 1 capsule (25 mg) by mouth as needed at bedtime for itching., Disp: , Rfl:     famotidine (Pepcid) 20 mg tablet, Take 1 tablet (20 mg) by mouth as needed at bedtime for indigestion., Disp: , Rfl:     levETIRAcetam (Keppra) 500 mg tablet, Take 1 tablet (500 mg) by mouth 2 times a day., Disp: 90 tablet, Rfl: 3    metoprolol succinate XL (Toprol-XL) 25 mg 24 hr tablet, TAKE 1 TABLET DAILY (DO NOT CRUSH OR CHEW), Disp: 90 tablet, Rfl: 1  Allergies   Allergen Reactions    Sulfa (Sulfonamide Antibiotics) Unknown     There were no vitals taken for this visit.  Neurological Exam/Physical Exam:  alert, oriented. speech is clear, fluent.  appropriate, conversational.          Labs:  CBC:   Lab Results   Component Value Date    WBC 4.5 03/22/2025    HGB 14.9 03/22/2025    HCT 45.0 03/22/2025     03/22/2025     BMP:   Lab Results   Component Value Date     04/03/2025    K 4.3 04/03/2025     04/03/2025    CO2 31 04/03/2025    BUN 17 04/03/2025    CREATININE 0.74 04/03/2025    CALCIUM 9.7 04/03/2025    MG 2.03 03/22/2025    PHOS 3.4 03/22/2025     LFT:   Lab Results   Component Value Date    ALKPHOS 50 03/22/2025    BILITOT 0.6 03/22/2025    BILIDIR 0.2 11/10/2023    PROT 7.4 03/22/2025    ALBUMIN 4.5 03/22/2025    ALT 32 03/22/2025    AST 32 03/22/2025     MRI brain shows diffuse volume loss and chronic small vessel disease. Prior unchanged cavernous angioma in L temporal lobe.  B12 and TSH WNL.     Assessment/Plan   Problem List Items Addressed This Visit    None      continue Keppra 500mg BID for shaking episodes , seems to have treated and helped that.       Continue aspirin and lipitor.       Total time with patient: 22 minutes.

## 2025-07-21 ENCOUNTER — APPOINTMENT (OUTPATIENT)
Dept: PODIATRY | Facility: CLINIC | Age: 80
End: 2025-07-21
Payer: MEDICARE

## 2025-07-21 DIAGNOSIS — M79.674 TOE PAIN, RIGHT: Primary | ICD-10-CM

## 2025-07-21 DIAGNOSIS — M79.675 TOE PAIN, LEFT: ICD-10-CM

## 2025-07-21 DIAGNOSIS — B35.1 TINEA UNGUIUM: ICD-10-CM

## 2025-07-21 PROCEDURE — 11721 DEBRIDE NAIL 6 OR MORE: CPT | Performed by: PODIATRIST

## 2025-07-21 NOTE — PROGRESS NOTES
Podiatry  CC:  painful thickened and elongated toenails, orthotics.     HPI:  Pt presents complaining painful thickened and elongated toenails that are difficult to manage.  Onset was gradual with worsening course until recently.  Aggravated by shoe gear and ambulation. Had some seuzures, see's neuro.        PCP: Dr. Armstrong  Last visit: 5-6-25     PMH  Medical History           Past Medical History:   Diagnosis Date    Cataract      HL (hearing loss)      Osteoporosis      Stroke (CMS/HCC)      Visual impairment           MEDS     Current Outpatient Medications:     aspirin 81 mg EC tablet, Take 1 tablet (81 mg) by mouth once daily., Disp: , Rfl:     atorvastatin (Lipitor) 40 mg tablet, Take 1 tablet (40 mg) by mouth once daily. At bedtime, Disp: 90 tablet, Rfl: 1    b complex vitamins (Vitamins B Complex) capsule, Take 1 capsule by mouth once daily., Disp: , Rfl:     calcium carbonate 600 mg calcium (1,500 mg) tablet, Take 1 tablet (1,500 mg) by mouth once daily., Disp: , Rfl:     cholecalciferol (Vitamin D-3) 125 MCG (5000 UT) capsule, Take 1 capsule (125 mcg) by mouth once daily., Disp: , Rfl:     famotidine (Pepcid) 20 mg tablet, Take 1 tablet (20 mg) by mouth as needed at bedtime for indigestion., Disp: , Rfl:     metoprolol succinate XL (Toprol-XL) 25 mg 24 hr tablet, TAKE 1 TABLET DAILY (DO NOT CRUSH OR CHEW), Disp: 90 tablet, Rfl: 1  Allergies          Allergies   Allergen Reactions    Sulfa (Sulfonamide Antibiotics) Unknown      Social History   Social History                Socioeconomic History    Marital status:        Spouse name: None    Number of children: None    Years of education: None    Highest education level: None   Occupational History    None   Tobacco Use    Smoking status: Never       Passive exposure: Past    Smokeless tobacco: Never   Vaping Use    Vaping Use: Never used   Substance and Sexual Activity    Alcohol use: Yes       Alcohol/week: 2.0 standard drinks of alcohol        Types: 1 Cans of beer, 1 Standard drinks or equivalent per week       Comment: very rare    Drug use: Never    Sexual activity: None   Other Topics Concern    None   Social History Narrative    None      Social Determinants of Health      Financial Resource Strain: Not on file   Food Insecurity: Not on file   Transportation Needs: Not on file   Physical Activity: Not on file   Stress: Not on file   Social Connections: Not on file   Intimate Partner Violence: Not on file   Housing Stability: Not on file         Family History               Family History   Problem Relation Name Age of Onset    Hypertension Mother        Hypertension Father        Colon cancer Mother's Brother        Stroke Maternal Grandmother             Surgical History             Past Surgical History:   Procedure Laterality Date    ADENOIDECTOMY         SECTION, LOW TRANSVERSE        EYE SURGERY        HERNIA REPAIR        TONSILLECTOMY        TUBAL LIGATION        WISDOM TOOTH EXTRACTION                REVIEW OF SYSTEMS     DERM:   + as noted in HPI.         Physical examination:   On General Observation: Patient is a pleasant, cooperative, well developed 78 y.o.  adult female. The patient is alert and oriented to time, place and person. Patient has normal affect and mood.  There were no vitals taken for this visit.     Vascular:  DP and PT pulses are 2/4 b/l.  mild edema noted. mild varicosities b/l.  CFT  6 seconds to all digits bilateral.  Skin temperature is warm to warm from proximal to distal bilateral.       Muscular: Strength is 5/5 for all instrinsic and extrinsic muscle groups.      Neuro:  Proprioception present.   Sensation to vibration is  present. Protective sensation intact  at all pedal sites via [place] Ricky 5.07 monofilament bilateral.  Light touch present bilateral.      Derm:    Decreased hair growth b/l le  Left toenails: 1-5 Brittleness, crumbling upon debridement, subungual debris, elongation, mycotic  appearance, tenderness, and thickness.   Right toenails: 1-5 Brittleness, crumbling upon debridement, subungual debris, elongation, mycotic appearance, tenderness, and thickness.      Ortho:  Prominence is present lateral 5th metatarsal head, loss of the plantar fat pad.     ASSESSMENT:          Tinea Unguium [B35.1]   Pain in right toe(s) [M79.674]   Pain in left toe(s) [M79.675]         PLAN:      - Debrided toenails 1-10 in length and height.   - Follow up in 9-12 weeks.         Piter Xiao DPM

## 2025-10-24 ENCOUNTER — APPOINTMENT (OUTPATIENT)
Dept: PODIATRY | Facility: CLINIC | Age: 80
End: 2025-10-24
Payer: MEDICARE

## 2025-11-04 ENCOUNTER — APPOINTMENT (OUTPATIENT)
Dept: PRIMARY CARE | Facility: CLINIC | Age: 80
End: 2025-11-04
Payer: MEDICARE